# Patient Record
Sex: MALE | Race: OTHER | Employment: UNEMPLOYED | URBAN - METROPOLITAN AREA
[De-identification: names, ages, dates, MRNs, and addresses within clinical notes are randomized per-mention and may not be internally consistent; named-entity substitution may affect disease eponyms.]

---

## 2017-11-05 ENCOUNTER — APPOINTMENT (OUTPATIENT)
Dept: MRI IMAGING | Age: 42
End: 2017-11-05
Attending: HOSPITALIST
Payer: SUBSIDIZED

## 2017-11-05 ENCOUNTER — HOSPITAL ENCOUNTER (OUTPATIENT)
Age: 42
Setting detail: OBSERVATION
Discharge: HOME | End: 2017-11-06
Attending: EMERGENCY MEDICINE | Admitting: HOSPITALIST
Payer: SUBSIDIZED

## 2017-11-05 ENCOUNTER — APPOINTMENT (OUTPATIENT)
Dept: CT IMAGING | Age: 42
End: 2017-11-05
Attending: EMERGENCY MEDICINE
Payer: SUBSIDIZED

## 2017-11-05 DIAGNOSIS — H53.2 DIPLOPIA: Primary | ICD-10-CM

## 2017-11-05 DIAGNOSIS — H49.22 SIXTH NERVE PALSY OF LEFT EYE: ICD-10-CM

## 2017-11-05 LAB
ALBUMIN SERPL-MCNC: 4 G/DL (ref 3.5–5)
ALBUMIN/GLOB SERPL: 1.2 {RATIO} (ref 1.1–2.2)
ALP SERPL-CCNC: 99 U/L (ref 45–117)
ALT SERPL-CCNC: 27 U/L (ref 12–78)
ANION GAP SERPL CALC-SCNC: 6 MMOL/L (ref 5–15)
APPEARANCE UR: CLEAR
APTT PPP: 31.3 SEC (ref 22.1–32.5)
AST SERPL-CCNC: 19 U/L (ref 15–37)
BASOPHILS # BLD: 0 K/UL (ref 0–0.1)
BASOPHILS NFR BLD: 0 % (ref 0–1)
BILIRUB SERPL-MCNC: 0.5 MG/DL (ref 0.2–1)
BILIRUB UR QL: NEGATIVE
BUN SERPL-MCNC: 11 MG/DL (ref 6–20)
BUN/CREAT SERPL: 13 (ref 12–20)
CALCIUM SERPL-MCNC: 8.9 MG/DL (ref 8.5–10.1)
CHLORIDE SERPL-SCNC: 106 MMOL/L (ref 97–108)
CO2 SERPL-SCNC: 28 MMOL/L (ref 21–32)
COLOR UR: NORMAL
CREAT SERPL-MCNC: 0.84 MG/DL (ref 0.7–1.3)
EOSINOPHIL # BLD: 0.1 K/UL (ref 0–0.4)
EOSINOPHIL NFR BLD: 1 % (ref 0–7)
ERYTHROCYTE [DISTWIDTH] IN BLOOD BY AUTOMATED COUNT: 13 % (ref 11.5–14.5)
GLOBULIN SER CALC-MCNC: 3.4 G/DL (ref 2–4)
GLUCOSE SERPL-MCNC: 96 MG/DL (ref 65–100)
GLUCOSE UR STRIP.AUTO-MCNC: NEGATIVE MG/DL
HCT VFR BLD AUTO: 40.6 % (ref 36.6–50.3)
HGB BLD-MCNC: 13.7 G/DL (ref 12.1–17)
HGB UR QL STRIP: NEGATIVE
INR PPP: 1 (ref 0.9–1.1)
KETONES UR QL STRIP.AUTO: NEGATIVE MG/DL
LEUKOCYTE ESTERASE UR QL STRIP.AUTO: NEGATIVE
LYMPHOCYTES # BLD: 1.5 K/UL (ref 0.8–3.5)
LYMPHOCYTES NFR BLD: 18 % (ref 12–49)
MCH RBC QN AUTO: 30.5 PG (ref 26–34)
MCHC RBC AUTO-ENTMCNC: 33.7 G/DL (ref 30–36.5)
MCV RBC AUTO: 90.4 FL (ref 80–99)
MONOCYTES # BLD: 0.4 K/UL (ref 0–1)
MONOCYTES NFR BLD: 5 % (ref 5–13)
NEUTS SEG # BLD: 6 K/UL (ref 1.8–8)
NEUTS SEG NFR BLD: 76 % (ref 32–75)
NITRITE UR QL STRIP.AUTO: NEGATIVE
PH UR STRIP: 6.5 [PH] (ref 5–8)
PLATELET # BLD AUTO: 140 K/UL (ref 150–400)
POTASSIUM SERPL-SCNC: 3.8 MMOL/L (ref 3.5–5.1)
PROT SERPL-MCNC: 7.4 G/DL (ref 6.4–8.2)
PROT UR STRIP-MCNC: NEGATIVE MG/DL
PROTHROMBIN TIME: 10.7 SEC (ref 9–11.1)
RBC # BLD AUTO: 4.49 M/UL (ref 4.1–5.7)
SODIUM SERPL-SCNC: 140 MMOL/L (ref 136–145)
SP GR UR REFRACTOMETRY: <1.005 (ref 1–1.03)
THERAPEUTIC RANGE,PTTT: NORMAL SECS (ref 58–77)
UR CULT HOLD, URHOLD: NORMAL
UROBILINOGEN UR QL STRIP.AUTO: 0.2 EU/DL (ref 0.2–1)
WBC # BLD AUTO: 8 K/UL (ref 4.1–11.1)

## 2017-11-05 PROCEDURE — 74011250636 HC RX REV CODE- 250/636: Performed by: HOSPITALIST

## 2017-11-05 PROCEDURE — 99284 EMERGENCY DEPT VISIT MOD MDM: CPT

## 2017-11-05 PROCEDURE — 81003 URINALYSIS AUTO W/O SCOPE: CPT | Performed by: EMERGENCY MEDICINE

## 2017-11-05 PROCEDURE — 70553 MRI BRAIN STEM W/O & W/DYE: CPT

## 2017-11-05 PROCEDURE — 70450 CT HEAD/BRAIN W/O DYE: CPT

## 2017-11-05 PROCEDURE — A9576 INJ PROHANCE MULTIPACK: HCPCS | Performed by: HOSPITALIST

## 2017-11-05 PROCEDURE — 36415 COLL VENOUS BLD VENIPUNCTURE: CPT | Performed by: EMERGENCY MEDICINE

## 2017-11-05 PROCEDURE — 85730 THROMBOPLASTIN TIME PARTIAL: CPT | Performed by: EMERGENCY MEDICINE

## 2017-11-05 PROCEDURE — 85025 COMPLETE CBC W/AUTO DIFF WBC: CPT | Performed by: EMERGENCY MEDICINE

## 2017-11-05 PROCEDURE — 85610 PROTHROMBIN TIME: CPT | Performed by: EMERGENCY MEDICINE

## 2017-11-05 PROCEDURE — 99218 HC RM OBSERVATION: CPT

## 2017-11-05 PROCEDURE — 80053 COMPREHEN METABOLIC PANEL: CPT | Performed by: EMERGENCY MEDICINE

## 2017-11-05 PROCEDURE — 74011250637 HC RX REV CODE- 250/637: Performed by: HOSPITALIST

## 2017-11-05 RX ORDER — SODIUM CHLORIDE 0.9 % (FLUSH) 0.9 %
5-10 SYRINGE (ML) INJECTION EVERY 8 HOURS
Status: DISCONTINUED | OUTPATIENT
Start: 2017-11-05 | End: 2017-11-06 | Stop reason: HOSPADM

## 2017-11-05 RX ORDER — SODIUM CHLORIDE 0.9 % (FLUSH) 0.9 %
10 SYRINGE (ML) INJECTION
Status: COMPLETED | OUTPATIENT
Start: 2017-11-05 | End: 2017-11-05

## 2017-11-05 RX ORDER — SODIUM CHLORIDE 0.9 % (FLUSH) 0.9 %
5-10 SYRINGE (ML) INJECTION AS NEEDED
Status: DISCONTINUED | OUTPATIENT
Start: 2017-11-05 | End: 2017-11-06 | Stop reason: HOSPADM

## 2017-11-05 RX ORDER — ACETAMINOPHEN 325 MG/1
650 TABLET ORAL
Status: DISCONTINUED | OUTPATIENT
Start: 2017-11-05 | End: 2017-11-06 | Stop reason: HOSPADM

## 2017-11-05 RX ORDER — GUAIFENESIN 100 MG/5ML
81 LIQUID (ML) ORAL DAILY
Status: DISCONTINUED | OUTPATIENT
Start: 2017-11-06 | End: 2017-11-06 | Stop reason: HOSPADM

## 2017-11-05 RX ADMIN — ACETAMINOPHEN 650 MG: 325 TABLET ORAL at 19:18

## 2017-11-05 RX ADMIN — Medication 10 ML: at 19:18

## 2017-11-05 RX ADMIN — GADOTERIDOL 18 ML: 279.3 INJECTION, SOLUTION INTRAVENOUS at 16:06

## 2017-11-05 RX ADMIN — Medication 10 ML: at 15:58

## 2017-11-05 NOTE — ROUTINE PROCESS
TRANSFER - OUT REPORT:    Verbal report given to Susan(felipe) on Coleen Nathalie  being transferred to (unit) for routine progression of care       Report consisted of patients Situation, Background, Assessment and   Recommendations(SBAR). Information from the following report(s) SBAR, ED Summary, Intake/Output, MAR and Recent Results was reviewed with the receiving nurse. Lines:   Peripheral IV 11/05/17 Right Antecubital (Active)   Site Assessment Clean, dry, & intact 11/5/2017 11:54 AM   Phlebitis Assessment 0 11/5/2017 11:54 AM   Infiltration Assessment 0 11/5/2017 11:54 AM   Dressing Status Clean, dry, & intact 11/5/2017 11:54 AM   Dressing Type Tape;Transparent 11/5/2017 11:54 AM   Hub Color/Line Status Pink;Capped;Flushed;Patent 11/5/2017 11:54 AM   Action Taken Blood drawn 11/5/2017 11:54 AM   Alcohol Cap Used Yes 11/5/2017 11:54 AM        Opportunity for questions and clarification was provided.       Patient transported with:   Tech      To MRI and then 502

## 2017-11-05 NOTE — ED NOTES
Bedside and Verbal shift change report given to Yajaira Perea RN (oncoming nurse) by Carly Pringle RN (offgoing nurse). Report included the following information SBAR and ED Summary.

## 2017-11-05 NOTE — IP AVS SNAPSHOT
[address] [place] [address] 
[phone] Patient: Karely Nguyen MRN: [id number] RMQ:2/84/0215 My Medications TAKE these medications as instructed Instructions Each Dose to Equal  
 Morning Noon Evening Bedtime  
 aspirin 81 mg chewable tablet Start taking on:  11/7/2017 Your last dose was: Your next dose is: Take 1 Tab by mouth daily. 81 mg Where to Get Your Medications Information on where to get these meds will be given to you by the nurse or doctor. ! Ask your nurse or doctor about these medications  
  aspirin 81 mg chewable tablet

## 2017-11-05 NOTE — CONSULTS
Consult dictated. Sudden onset isolated sixth nerve palsy on the left-likely idiopathic. If MRI negative, may DC home. Will need therapy for vision.   Katey Sneed MD

## 2017-11-05 NOTE — ED PROVIDER NOTES
HPI Comments: 36 y.o. male with no significant past medical history who presents from Home with chief complaint of Visual Disturbance. Patient reports onset yesterday morning of a generalized frontal headache and dizziness. Patient states onset \"yesterday around 1600\" of double vision for a duration \"of about 5 minutes while he was driving described as \"seeing double lines and double cars\". Pt reports reoccurring symptoms last night which has been constant since onset, described as \"when looking straight on he sees double in his left eye\". Patient had a tooth pulled about \"15 days ago\" during which time he was taking Amoxicillin. Pt denies previous history of symptoms similar to those previous today. Pt denies previous history of Hypertension. Pt reports previous tobacco use, last used six months ago and alcohol use, last drank two months ago. Pt denies fever, chills, cough, congestion, SOB, chest pain, abdominal pain, nausea, vomiting, diarrhea, difficulty urinating, or dysuria. Pt denies any other acute medical complaints. There are no other acute medical concerns at this time. Social hx: Patient is Korean speaking    Note written by Vincent Last, as dictated by Onesimo Mabry MD 11:00 AM    The history is provided by the patient. The history is limited by a language barrier. History reviewed. No pertinent past medical history. History reviewed. No pertinent surgical history. History reviewed. No pertinent family history. Social History     Social History    Marital status:      Spouse name: N/A    Number of children: N/A    Years of education: N/A     Occupational History    Not on file.      Social History Main Topics    Smoking status: Not on file    Smokeless tobacco: Not on file    Alcohol use Not on file    Drug use: Not on file    Sexual activity: Not on file     Other Topics Concern    Not on file     Social History Narrative    No narrative on file ALLERGIES: Review of patient's allergies indicates no known allergies. Review of Systems   Constitutional: Negative for chills and fever. HENT: Negative for congestion. Eyes: Positive for visual disturbance. Respiratory: Negative for cough and shortness of breath. Cardiovascular: Negative for chest pain. Gastrointestinal: Negative for abdominal pain, diarrhea, nausea and vomiting. Genitourinary: Negative for difficulty urinating and dysuria. Neurological: Positive for dizziness and headaches. All other systems reviewed and are negative. Vitals:    11/05/17 1030   BP: 145/86   Pulse: 63   Resp: 18   Temp: 97.9 °F (36.6 °C)   SpO2: 98%   Weight: 86.6 kg (191 lb)            Physical Exam   Constitutional: He is oriented to person, place, and time. He appears well-developed and well-nourished. No distress. HENT:   Head: Normocephalic and atraumatic. Nose: Nose normal.   Mouth/Throat: Oropharynx is clear and moist.   Eyes: Conjunctivae and EOM are normal. Pupils are equal, round, and reactive to light. No scleral icterus. Neck: Normal range of motion. Neck supple. No JVD present. No tracheal deviation present. No thyromegaly present. No carotid bruits noted. Cardiovascular: Normal rate, regular rhythm, normal heart sounds and intact distal pulses. Exam reveals no gallop and no friction rub. No murmur heard. Pulmonary/Chest: Effort normal and breath sounds normal. No respiratory distress. He has no wheezes. He has no rales. He exhibits no tenderness. Abdominal: Soft. Bowel sounds are normal. He exhibits no distension and no mass. There is no tenderness. There is no rebound and no guarding. Musculoskeletal: Normal range of motion. He exhibits no edema or tenderness. Lymphadenopathy:     He has no cervical adenopathy. Neurological: He is alert and oriented to person, place, and time. He has normal reflexes. 5/5 strength with handgrips, biceps, and triceps.  5/5 strength with plantar dorsiflexion and extension. lateral movement of left eye is minimal, diplopia with lateral gaze only. Appears to have a possible 6th nerve palsy left     Skin: Skin is warm and dry. No rash noted. No erythema. Psychiatric: He has a normal mood and affect. His behavior is normal. Judgment and thought content normal.   Nursing note and vitals reviewed. Note written by Vincent Fernandez, as dictated by Adebayo Luke MD 11:02 AM      MDM  Number of Diagnoses or Management Options  Diplopia: new and requires workup     Amount and/or Complexity of Data Reviewed  Clinical lab tests: ordered and reviewed  Tests in the radiology section of CPT®: ordered and reviewed  Decide to obtain previous medical records or to obtain history from someone other than the patient: yes  Review and summarize past medical records: yes  Discuss the patient with other providers: yes  Independent visualization of images, tracings, or specimens: yes    Risk of Complications, Morbidity, and/or Mortality  Presenting problems: high  Diagnostic procedures: high  Management options: high    Patient Progress  Patient progress: stable    ED Course       Procedures  Patient's lab and CT appear normal. Consult is placed with neurology and with hospitalist for further evaluation of this acute onset of diplopia and paralysis of lateral gaze with the left eye. CONSULT NOTE:  1:02 PM Adebayo Luke MD spoke with Dr. Olamide Maria, Consult for Neurology. Discussed available thania gnostic tests and clinical findings. He is in agreement with care plans as outlined. CONSULT NOTE:  1:26 PM Adebayo Luke MD spoke with Dr. Cheyanne Bowen, Consult for Cardiology. Discussed available diagnostic tests and clinical findings. He is in agreement with care plans as outlined.

## 2017-11-05 NOTE — ED TRIAGE NOTES
Pt's daughter reports he began with double vision yesterday morning, along with mild discomfort to forehead. Pt denies dizziness, nausea or vomiting. Pt has had resent dental work to lower jaw.

## 2017-11-06 VITALS
WEIGHT: 191 LBS | SYSTOLIC BLOOD PRESSURE: 120 MMHG | HEART RATE: 58 BPM | DIASTOLIC BLOOD PRESSURE: 67 MMHG | TEMPERATURE: 98.9 F | RESPIRATION RATE: 16 BRPM | OXYGEN SATURATION: 97 %

## 2017-11-06 LAB
ATRIAL RATE: 59 BPM
CALCULATED P AXIS, ECG09: 51 DEGREES
CALCULATED R AXIS, ECG10: 14 DEGREES
CALCULATED T AXIS, ECG11: 7 DEGREES
CHOLEST SERPL-MCNC: 146 MG/DL
DIAGNOSIS, 93000: NORMAL
HDLC SERPL-MCNC: 55 MG/DL
HDLC SERPL: 2.7 {RATIO} (ref 0–5)
LDLC SERPL CALC-MCNC: 70 MG/DL (ref 0–100)
LIPID PROFILE,FLP: NORMAL
P-R INTERVAL, ECG05: 162 MS
Q-T INTERVAL, ECG07: 406 MS
QRS DURATION, ECG06: 96 MS
QTC CALCULATION (BEZET), ECG08: 401 MS
TRIGL SERPL-MCNC: 105 MG/DL (ref ?–150)
VENTRICULAR RATE, ECG03: 59 BPM
VLDLC SERPL CALC-MCNC: 21 MG/DL

## 2017-11-06 PROCEDURE — 93005 ELECTROCARDIOGRAM TRACING: CPT

## 2017-11-06 PROCEDURE — 74011250637 HC RX REV CODE- 250/637: Performed by: HOSPITALIST

## 2017-11-06 PROCEDURE — 36415 COLL VENOUS BLD VENIPUNCTURE: CPT | Performed by: HOSPITALIST

## 2017-11-06 PROCEDURE — 99218 HC RM OBSERVATION: CPT

## 2017-11-06 PROCEDURE — 80061 LIPID PANEL: CPT | Performed by: HOSPITALIST

## 2017-11-06 RX ORDER — GUAIFENESIN 100 MG/5ML
81 LIQUID (ML) ORAL DAILY
Qty: 30 TAB | Refills: 0 | Status: SHIPPED | OUTPATIENT
Start: 2017-11-07

## 2017-11-06 RX ADMIN — Medication 10 ML: at 05:26

## 2017-11-06 RX ADMIN — ASPIRIN 81 MG 81 MG: 81 TABLET ORAL at 08:55

## 2017-11-06 RX ADMIN — ACETAMINOPHEN 650 MG: 325 TABLET ORAL at 08:55

## 2017-11-06 NOTE — H&P
[address] [place] [address], [address] [place] [address]   HISTORY AND PHYSICAL       Name:  Herberth Edouard   MR#:  [id number]   :  1975   Account #:  [de-identified]        Date of Adm:  2017       PRIMARY CARE PHYSICIAN:  None. PRESENTING COMPLAINT:  Headache and double vision. HISTORY OF PRESENT ILLNESS:  This patient is a 44-year-old   German-speaking gentleman who presented due to a mild headache   which started yesterday morning. The patient had double vision which   started at 4:00 p.m. yesterday and lasted for 5 minutes. At that time,   he was driving. The patient's symptoms resolved. However, when he   woke up this morning, he still had double vision. The patient had some   teeth pulled out 15 days ago. At that time, he was given amoxicillin. He denies having any limb weakness, chest pain, shortness of breath,   nausea, vomiting, fever, or chills. PAST MEDICAL HISTORY:  Not significant. SOCIOECONOMIC HISTORY:  The patient does not smoke or drink. He is  and works [employer]. FAMILY HISTORY:  Unremarkable for stroke or coronary artery   disease. CURRENT MEDICATIONS:  None. ALLERGIES:  NO KNOWN DRUG ALLERGIES. PHYSICAL EXAMINATION   GENERAL:  On examination, the patient is a 44-year-old gentleman   who is not in any acute distress. VITAL SIGNS:  Temperature of 97.9. Blood pressure 145/86. Pulse   63. Respiratory rate is 18. Saturation 98%. HEENT:  Examination reveals pupils equally reacting to light and   accommodation. NECK:  Supple. There is no lymphadenopathy or JVD. CHEST:  Clear. No wheezing or crackles. CARDIOVASCULAR:  S1 and S2 regular. No murmur. No S3.   ABDOMEN:  Abdominal examination reveals no tenderness, no   guarding, and no rigidity. Bowel sounds are active. EXTREMITIES:  No pedal edema. CENTRAL NERVOUS SYSTEM:  Examination reveals the patient to   be alert and oriented.   He has normal strength and normal reflexes. Plantars are downgoing. His visual exam reveals left lateral rectus   palsy. There is no pronator drift. There is no tingling or numbness. are unremarkable. Cerebellar and gait examinations are   unremarkable. SKIN:  Examination is unremarkable. LABORATORY DATA:  Lab works reveals white count of 8,   hemoglobin of 13.7, hematocrit 40.6, MCV 90.4, and platelet count was   140,000. Chemistries revealed sodium 140, potassium 3.8, chloride   106, bicarbonate 28, gap of 6, glucose 96, BUN 11, creatinine 0.84,   bilirubin 0.5, protein 7.4, albumin was 4, and globulin was 3.4. AST,   ALT, and alkaline phosphatase were all within normal limits. Urinalysis   was unremarkable. INR was 1. A CT scan of the head showed no acute finding. An EKG has not   been done. ASSESSMENT AND PLAN   This patient is a 65-year-old gentleman with no past medical history. He is admitted due to:    1. Diplopia and headache due to isolated sixth cranial nerve palsy:    The case was discussed with Josh Arriaga M.D., who   recommended admission for further workup. 2. We will order and MRI, Carotid doppler and start on aspirin. 3. Further orders per Neurology. The patient is admitted for   observation.          Yesi Shah MD      [facility] / [facility]   D:  11/05/2017   13:45   T:  11/05/2017   21:35   Job #:  [id number]

## 2017-11-06 NOTE — PROGRESS NOTES
Reviewed medical chart; met with the patient at the bedside along with his grown daughter, Mally Jeremías DUFFY#[phone]. Patient speaks limited English, but opted not to use a blue phone; his daughter speaks [place] fluently. Patient is being seen for diplopia. Patient lives with his wife and two daughters. He is employed as a , but does not have insurance. Patient does not have a PCP; he gets his prescriptions at [facility] on [address]. Patient utilized the [facility] approximately 4 years ago, but is no longer connected. Provided patient with list of free clinics in the area, a copy of the Care Card application, contact information for Maria Isabel Dasilva, financial counselor for the hospital, and a list of medication assistance program/coupon websites. Current Discharge Plan:  Called and spoke with Ramana Nesbitt at the [address] [place] [address] to schedule a financial screening appointment for Monday, 11/13/17 at 9:00AM.  Patient may not qualify financially, as he works as a  and his wife is employed at [employer]. In the event that he is over assets financially, provided a list of [facility] physicians from which to choose. Discharge Caregiver: Patient's daughter, Mally Jeremías - Y#[phone]. Care Management Interventions  PCP Verified by CM:  Yes  Palliative Care Criteria Met (RRAT>21 & CHF Dx)?: No  Mode of Transport at Discharge:  (Patient will travel home via car.  )  MyChart Signup: No  Discharge Durable Medical Equipment: No  Physical Therapy Consult: No  Occupational Therapy Consult: No  Speech Therapy Consult: No  Current Support Network: Lives with Spouse  Confirm Follow Up Transport:  (Patient will travel home via car.  )  Plan discussed with Pt/Family/Caregiver: Yes  Freedom of Choice Offered: Yes  Discharge Location  Discharge Placement: Home

## 2017-11-06 NOTE — DISCHARGE SUMMARY
Discharge Summary       PATIENT ID: Tony Delarosa  MRN: [id number]   YOB: 1975    DATE OF ADMISSION: 11/5/2017 10:39 AM    DATE OF DISCHARGE: 11/6/2017    PRIMARY CARE PROVIDER: None     ATTENDING PHYSICIAN:  Getachew Zepeda MD   DISCHARGING PROVIDER: Getachew Zepeda MD    To contact this individual call [phone] and ask the  to page. If unavailable ask to be transferred the Adult Hospitalist Department. CONSULTATIONS: IP CONSULT TO NEUROLOGY  IP CONSULT TO NEUROLOGY  IP CONSULT TO HOSPITALIST    PROCEDURES/SURGERIES: * No surgery found *    ADMITTING [address] [place] [address] COURSE:       Per Neurology: Katey Sneed MD 11/5:         MRI scan of the brain is negative to my review.     ASSESSMENT AND PLAN: A 50-year-old male with no known past   medical history who is admitted with sudden onset of double vision. On   examination, he has isolated sixth nerve palsy on the left. This is most   likely idiopathic. If the MRI is negative, we may discharge him home   and treatment essentially is supportive, including outpatient therapy. If   his symptoms do not improve in the next 2 to 3 weeks, or he develops   any other symptoms, he should followup with neurology and may need   additional workup.       Please feel free to contact me with any further questions. Thank you   for this consultation. Per case:       JAMEE Hogue Care Management Addendum  Progress Notes Date of Service: 11/06/17 1128         []Hide copied text  Reviewed medical chart; met with the patient at the bedside along with his grown daughter, Yue BERMUDEZ#[phone]. Patient speaks limited English, but opted not to use a blue phone; his daughter speaks [place] fluently. Patient is being seen for diplopia. Patient lives with his wife and two daughters. He is employed as a , but does not have insurance. Patient does not have a PCP; he gets his prescriptions at [facility] on [address].    Patient utilized the [facility] approximately 4 years ago, but is no longer connected. Provided patient with list of free clinics in the area, a copy of the Care Card application, contact information for Corbin Moreno, financial counselor for the hospital, and a list of medication assistance program/coupon websites.       Current Discharge Plan:  Called and spoke with Ibis Arango at the [address] [place] [address] to schedule a financial screening appointment for Monday, 11/13/17 at 9:00AM.  Patient may not qualify financially, as he works as a  and his wife is employed at [employer]. In the event that he is over assets financially, provided a list of [facility] physicians from which to choose. Discharge Caregiver: Patient's daughter, Peter LOPEZ#[phone]. DISCHARGE DIAGNOSES / PLAN:      1.  as above        PENDING TEST RESULTS:   At the time of discharge the following test results are still pending: na    FOLLOW UP APPOINTMENTS:    Follow-up Information     Follow up With Details Comments Contact Info    [facility] On 11/13/2017 Financial Screening Appointment: Monday, 11/13/17 at 9:00AM.  (Medical Appointment will be scheduled when you complete the financial screening). Please call the [facility] at G#[phone] if you need to change this appointment. [facility]      Brittani Mcfadden MD In 3 weeks Call to get a neurology follow up appointment in 3-4 weeks following your appointment at the [address] [place] [address] Way  [phone]             ADDITIONAL CARE RECOMMENDATIONS: na    DIET: Cardiac Diet     ACTIVITY: Activity as tolerated    WOUND CARE: na    EQUIPMENT needed: na      DISCHARGE MEDICATIONS:  Current Discharge Medication List      START taking these medications    Details   aspirin 81 mg chewable tablet Take 1 Tab by mouth daily.   Qty: 30 Tab, Refills: 0               NOTIFY YOUR PHYSICIAN FOR ANY OF THE FOLLOWING:   Fever over 101 degrees for 24 hours. Chest pain, shortness of breath, fever, chills, nausea, vomiting, diarrhea, change in mentation, falling, weakness, bleeding. Severe pain or pain not relieved by medications. Or, any other signs or symptoms that you may have questions about.     DISPOSITION:    Home With:   OT  PT  HH  RN       Long term SNF/Inpatient Rehab   x Independent/assisted living    Hospice    Other:       PATIENT CONDITION AT DISCHARGE:     Functional status    Poor     Deconditioned    x Independent      Cognition   x  Lucid     Forgetful     Dementia      Catheters/lines (plus indication)    Sheets     PICC     PEG    x None      Code status   x  Full code     DNR      PHYSICAL EXAMINATION AT DISCHARGE:   Refer to Progress Note          CHRONIC MEDICAL DIAGNOSES:  Problem List as of 11/6/2017  Date Reviewed: 11/6/2017          Codes Class Noted - Resolved    * (Principal)Diplopia ICD-10-CM: H53.2  ICD-9-CM: 368.2  11/5/2017 - Present              Greater than  20  minutes were spent with the patient on counseling and coordination of care    Signed:   Lacey Goldman MD  11/6/2017  12:30 PM

## 2017-11-06 NOTE — DISCHARGE INSTRUCTIONS
DISCHARGE SUMMARY from Nurse    PATIENT INSTRUCTIONS:    After general anesthesia or intravenous sedation, for 24 hours or while taking prescription Narcotics:  · Limit your activities  · Do not drive and operate hazardous machinery  · Do not make important personal or business decisions  · Do  not drink alcoholic beverages  · If you have not urinated within 8 hours after discharge, please contact your surgeon on call. Report the following to your surgeon:  · Excessive pain, swelling, redness or odor of or around the surgical area  · Temperature over 100.5  · Nausea and vomiting lasting longer than 4 hours or if unable to take medications  · Any signs of decreased circulation or nerve impairment to extremity: change in color, persistent  numbness, tingling, coldness or increase pain  · Any questions    What to do at Home:  Recommended activity: per instructions  If you experience any of the following symptoms per instructtions, please follow up with per instructions. *  Please give a list of your current medications to your Primary Care Provider. *  Please update this list whenever your medications are discontinued, doses are      changed, or new medications (including over-the-counter products) are added. *  Please carry medication information at all times in case of emergency situations. These are general instructions for a healthy lifestyle:    No smoking/ No tobacco products/ Avoid exposure to second hand smoke  Surgeon General's Warning:  Quitting smoking now greatly reduces serious risk to your health.     Obesity, smoking, and sedentary lifestyle greatly increases your risk for illness    A healthy diet, regular physical exercise & weight monitoring are important for maintaining a healthy lifestyle    You may be retaining fluid if you have a history of heart failure or if you experience any of the following symptoms:  Weight gain of 3 pounds or more overnight or 5 pounds in a week, increased swelling in our hands or feet or shortness of breath while lying flat in bed. Please call your doctor as soon as you notice any of these symptoms; do not wait until your next office visit. Recognize signs and symptoms of STROKE:    F-face looks uneven    A-arms unable to move or move unevenly    S-speech slurred or non-existent    T-time-call 911 as soon as signs and symptoms begin-DO NOT go       Back to bed or wait to see if you get better-TIME IS BRAIN. Warning Signs of HEART ATTACK     Call 911 if you have these symptoms:   Chest discomfort. Most heart attacks involve discomfort in the center of the chest that lasts more than a few minutes, or that goes away and comes back. It can feel like uncomfortable pressure, squeezing, fullness, or pain.  Discomfort in other areas of the upper body. Symptoms can include pain or discomfort in one or both arms, the back, neck, jaw, or stomach.  Shortness of breath with or without chest discomfort.  Other signs may include breaking out in a cold sweat, nausea, or lightheadedness. Don't wait more than five minutes to call 911 - MINUTES MATTER! Fast action can save your life. Calling 911 is almost always the fastest way to get lifesaving treatment. Emergency Medical Services staff can begin treatment when they arrive -- up to an hour sooner than if someone gets to the hospital by car. The discharge information has been reviewed with the patient and daughter. The patient and daughter verbalized understanding. Discharge medications reviewed with the patient and daughter and appropriate educational materials and side effects teaching were provided.   ___________________________________________________________________________________________________________________________________   Discharge Instructions       PATIENT ID: Coleen Zelaya  MRN: [id number]   YOB: 1975    DATE OF ADMISSION: 11/5/2017 10:39 AM    DATE OF DISCHARGE: 11/6/2017    PRIMARY CARE PROVIDER: None     ATTENDING PHYSICIAN: Sonia Mendze MD  DISCHARGING PROVIDER: Sonia Mendez MD    To contact this individual call [phone] and ask the  to page. If unavailable ask to be transferred the Adult Hospitalist Department. DISCHARGE DIAGNOSES 6 nerve palsy     CONSULTATIONS: IP CONSULT TO NEUROLOGY  IP CONSULT TO NEUROLOGY  IP CONSULT TO HOSPITALIST    PROCEDURES/SURGERIES: * No surgery found *    PENDING TEST RESULTS:   At the time of discharge the following test results are still pending: na    FOLLOW UP APPOINTMENTS:   Follow-up Information     Follow up With Details Comments Contact Info    [facility] On 11/13/2017 Financial Screening Appointment: Monday, 11/13/17 at 9:00AM.  (Medical Appointment will be scheduled when you complete the financial screening). Please call the [facility] at K#[phone] if you need to change this appointment. [facility]      Errol Heller MD In 3 weeks Call to get a neurology follow up appointment in 3-4 weeks following your appointment at the [address] [place] [address] Way  [phone]             ADDITIONAL CARE RECOMMENDATIONS: na    DIET: Cardiac Diet     ACTIVITY: Activity as tolerated    WOUND CARE: na    EQUIPMENT needed: na      DISCHARGE MEDICATIONS:   See Medication Reconciliation Form    · It is important that you take the medication exactly as they are prescribed. · Keep your medication in the bottles provided by the pharmacist and keep a list of the medication names, dosages, and times to be taken in your wallet. · Do not take other medications without consulting your doctor. NOTIFY YOUR PHYSICIAN FOR ANY OF THE FOLLOWING:   Fever over 101 degrees for 24 hours. Chest pain, shortness of breath, fever, chills, nausea, vomiting, diarrhea, change in mentation, falling, weakness, bleeding.  Severe pain or pain not relieved by medications. Or, any other signs or symptoms that you may have questions about.       DISPOSITION:    Home With:   OT  PT  HH  RN       SNF/Inpatient Rehab/LTAC   x Independent/assisted living    Hospice    Other:          Signed:   Amina John MD  11/6/2017  12:29 PM

## 2017-11-06 NOTE — ROUTINE PROCESS
Bedside and Verbal shift change report given to Bradford Sampson (oncoming nurse) by Wojciech Moreno (offgoing nurse). Report included the following information SBAR, Kardex, Intake/Output, MAR, Accordion and Recent Results. All opportunity for clarification/questions given.

## 2017-11-06 NOTE — ROUTINE PROCESS
Bedside and Verbal shift change report given to [facility], RN (oncoming nurse) by Yuli Mackay RN (offgoing nurse). Report included the following information SBAR, Kardex, Intake/Output, MAR, Accordion and Recent Results.

## 2017-11-06 NOTE — CONSULTS
[address] [facility] [place] [address]   [address]       Name:  Carlyle Brice   MR#:  [id number]   :  1975   Account #:  [de-identified]    Date of Consultation:  2017   Date of Adm:  2017       REQUESTING PHYSICIAN:  Dr. Byron Cox. REASON FOR EVALUATION: Double vision. HISTORY OF PRESENT ILLNESS: The patient is a 13-year-old male   with no significant past medical history, who has had some tooth   issues over the past couple of weeks, which have finally cleared up   after a tooth extraction and antibiotics, and he was doing fine until   yesterday. At around 4 p.m., he developed double vision that lasted for   a brief period of time when he was driving. He was seeing double lines   and double call cars. He noticed the symptoms again last night, which   has been constant and he decided to come into the hospital today. He   states that he sees fine when he looks over to the right, but when he   looks to the left things appear double. He notices mild pain and   discomfort around his eye on the left side. No fever, no nausea,   vomiting, difficulty swallowing, speech disturbance, healing problems,   or altered sensation. No focal motor deficits in the extremities. Does   not take any medications at home. PAST MEDICAL HISTORY: As mentioned above. SOCIAL HISTORY: , lives with his family. No history of   smoking. He last smoked more than 6 months ago and does not drink   alcohol on a regular basis. ALLERGIES: NONE. REVIEW OF SYSTEMS: As mentioned above. PHYSICAL EXAMINATION   GENERAL: The patient is alert, fully oriented. VITAL SIGNS: Blood pressure 131/86, temperature 98, pulse is 61. NEUROLOGIC: Speech is clear, comprehension is normal. Pupils are   equal and reactive. Extraocular movement evaluation reveals absent   abduction of the left eye upon lateral gaze. Other extraocular   movements are intact.  Pupils are equal and reactive. Face is   symmetric. Facial sensation is intact. Tongue is midline. Hearing is   baseline. Muscle tone and bulk is normal. Strength normal in all   extremities. Deep tendon reflexes are 2/2 and symmetric. Toes are   downgoing. Sensation intact. Gait normal.   HEART: Regular rate and rhythm. CHEST: Clear. ABDOMEN: Soft, nontender, positive bowel sounds. EXTREMITIES: No edema. LABORATORY DATA: CBC and chemistries unremarkable. MRI scan of the brain is negative to my review. ASSESSMENT AND PLAN: A 50-year-old male with no known past   medical history who is admitted with sudden onset of double vision. On   examination, he has isolated sixth nerve palsy on the left. This is most   likely idiopathic. If the MRI is negative, we may discharge him home   and treatment essentially is supportive, including outpatient therapy. If   his symptoms do not improve in the next 2 to 3 weeks, or he develops   any other symptoms, he should followup with neurology and may need   additional workup. Please feel free to contact me with any further questions. Thank you   for this consultation.              MD MARIANA Medrano / SANJAY   D:  11/05/2017   16:54   T:  11/05/2017   22:58   Job #:  [id number]

## 2017-11-06 NOTE — PROGRESS NOTES
Discharge instructions reviewed with family member and patient. Family member interpreted. Patient seen by Dr. Morelia Hutchins prior to discharge. Yessenia Garcia Discharge instruction understanding good.

## 2017-11-06 NOTE — PROGRESS NOTES
1904 Pt complaining of a headache and dizziness. Pt states that when he has his L eye closed the dizziness improves and requesting an eye patch. Spoke with Dr. Denise Jaimes and received orders for Tylenol 650 mg PO Q6H PRN and that pt may wear an eye patch on L eye PRN for comfort.

## 2017-11-17 ENCOUNTER — HOSPITAL ENCOUNTER (OUTPATIENT)
Dept: LAB | Age: 42
Discharge: HOME | End: 2017-11-17

## 2017-11-17 PROCEDURE — 80061 LIPID PANEL: CPT | Performed by: NURSE PRACTITIONER

## 2017-11-17 PROCEDURE — 80053 COMPREHEN METABOLIC PANEL: CPT | Performed by: NURSE PRACTITIONER

## 2017-11-18 LAB
ALBUMIN SERPL-MCNC: 4.2 G/DL (ref 3.5–5)
ALBUMIN/GLOB SERPL: 1.3 {RATIO} (ref 1.1–2.2)
ALP SERPL-CCNC: 113 U/L (ref 45–117)
ALT SERPL-CCNC: 69 U/L (ref 12–78)
ANION GAP SERPL CALC-SCNC: 9 MMOL/L (ref 5–15)
AST SERPL-CCNC: 24 U/L (ref 15–37)
BILIRUB SERPL-MCNC: 0.4 MG/DL (ref 0.2–1)
BUN SERPL-MCNC: 13 MG/DL (ref 6–20)
BUN/CREAT SERPL: 17 (ref 12–20)
CALCIUM SERPL-MCNC: 8.9 MG/DL (ref 8.5–10.1)
CHLORIDE SERPL-SCNC: 103 MMOL/L (ref 97–108)
CHOLEST SERPL-MCNC: 212 MG/DL
CO2 SERPL-SCNC: 27 MMOL/L (ref 21–32)
CREAT SERPL-MCNC: 0.75 MG/DL (ref 0.7–1.3)
GLOBULIN SER CALC-MCNC: 3.2 G/DL (ref 2–4)
GLUCOSE SERPL-MCNC: 84 MG/DL (ref 65–100)
HDLC SERPL-MCNC: 58 MG/DL
HDLC SERPL: 3.7 {RATIO} (ref 0–5)
LDLC SERPL CALC-MCNC: 127.4 MG/DL (ref 0–100)
LIPID PROFILE,FLP: ABNORMAL
POTASSIUM SERPL-SCNC: 4.4 MMOL/L (ref 3.5–5.1)
PROT SERPL-MCNC: 7.4 G/DL (ref 6.4–8.2)
SODIUM SERPL-SCNC: 139 MMOL/L (ref 136–145)
TRIGL SERPL-MCNC: 133 MG/DL (ref ?–150)
VLDLC SERPL CALC-MCNC: 26.6 MG/DL

## 2018-01-17 ENCOUNTER — HOSPITAL ENCOUNTER (OUTPATIENT)
Dept: LAB | Age: 43
Discharge: HOME | End: 2018-01-17

## 2018-01-17 LAB
BASOPHILS # BLD: 0 K/UL (ref 0–0.1)
BASOPHILS NFR BLD: 1 % (ref 0–1)
CHOLEST SERPL-MCNC: 174 MG/DL
EOSINOPHIL # BLD: 0.1 K/UL (ref 0–0.4)
EOSINOPHIL NFR BLD: 2 % (ref 0–7)
ERYTHROCYTE [DISTWIDTH] IN BLOOD BY AUTOMATED COUNT: 13.6 % (ref 11.5–14.5)
ERYTHROCYTE [SEDIMENTATION RATE] IN BLOOD: 4 MM/HR (ref 0–15)
HCT VFR BLD AUTO: 42.8 % (ref 36.6–50.3)
HDLC SERPL-MCNC: 62 MG/DL
HDLC SERPL: 2.8 {RATIO} (ref 0–5)
HGB BLD-MCNC: 13.9 G/DL (ref 12.1–17)
LDLC SERPL CALC-MCNC: 101 MG/DL (ref 0–100)
LIPID PROFILE,FLP: ABNORMAL
LYMPHOCYTES # BLD: 1.4 K/UL (ref 0.8–3.5)
LYMPHOCYTES NFR BLD: 24 % (ref 12–49)
MCH RBC QN AUTO: 30.2 PG (ref 26–34)
MCHC RBC AUTO-ENTMCNC: 32.5 G/DL (ref 30–36.5)
MCV RBC AUTO: 92.8 FL (ref 80–99)
MONOCYTES # BLD: 0.3 K/UL (ref 0–1)
MONOCYTES NFR BLD: 5 % (ref 5–13)
NEUTS SEG # BLD: 4 K/UL (ref 1.8–8)
NEUTS SEG NFR BLD: 68 % (ref 32–75)
PLATELET # BLD AUTO: 142 K/UL (ref 150–400)
RBC # BLD AUTO: 4.61 M/UL (ref 4.1–5.7)
TRIGL SERPL-MCNC: 55 MG/DL (ref ?–150)
VLDLC SERPL CALC-MCNC: 11 MG/DL
WBC # BLD AUTO: 5.9 K/UL (ref 4.1–11.1)

## 2018-01-17 PROCEDURE — 86618 LYME DISEASE ANTIBODY: CPT | Performed by: NURSE PRACTITIONER

## 2018-01-17 PROCEDURE — 85025 COMPLETE CBC W/AUTO DIFF WBC: CPT | Performed by: NURSE PRACTITIONER

## 2018-01-17 PROCEDURE — 80061 LIPID PANEL: CPT | Performed by: NURSE PRACTITIONER

## 2018-01-17 PROCEDURE — 85652 RBC SED RATE AUTOMATED: CPT | Performed by: NURSE PRACTITIONER

## 2018-01-19 LAB — B BURGDOR IGG+IGM SER-ACNC: <0.91 ISR (ref 0–0.9)

## 2018-02-14 ENCOUNTER — HOSPITAL ENCOUNTER (OUTPATIENT)
Dept: MRI IMAGING | Age: 43
Discharge: HOME | End: 2018-02-14
Attending: OPHTHALMOLOGY
Payer: SUBSIDIZED

## 2018-02-14 VITALS — BODY MASS INDEX: 29.65 KG/M2 | WEIGHT: 195 LBS

## 2018-02-14 DIAGNOSIS — H57.12 OCULAR PAIN, LEFT: ICD-10-CM

## 2018-02-14 DIAGNOSIS — H49.22 CRANIAL NERVE VI PALSY, LEFT: ICD-10-CM

## 2018-02-14 DIAGNOSIS — H53.2 DIPLOPIA: ICD-10-CM

## 2018-02-14 DIAGNOSIS — H05.20 OCULAR PROPTOSIS: ICD-10-CM

## 2018-02-14 PROCEDURE — A9576 INJ PROHANCE MULTIPACK: HCPCS | Performed by: OPHTHALMOLOGY

## 2018-02-14 PROCEDURE — 74011250636 HC RX REV CODE- 250/636: Performed by: OPHTHALMOLOGY

## 2018-02-14 PROCEDURE — 70553 MRI BRAIN STEM W/O & W/DYE: CPT

## 2018-02-14 RX ADMIN — GADOTERIDOL 18 ML: 279.3 INJECTION, SOLUTION INTRAVENOUS at 20:18

## 2018-02-26 ENCOUNTER — OFFICE VISIT (OUTPATIENT)
Dept: NEUROLOGY | Age: 43
End: 2018-02-26

## 2018-02-26 VITALS
WEIGHT: 192 LBS | RESPIRATION RATE: 14 BRPM | DIASTOLIC BLOOD PRESSURE: 90 MMHG | OXYGEN SATURATION: 97 % | HEART RATE: 78 BPM | SYSTOLIC BLOOD PRESSURE: 140 MMHG | HEIGHT: 68 IN | BODY MASS INDEX: 29.1 KG/M2

## 2018-02-26 DIAGNOSIS — H49.22 LATERAL RECTUS PALSY, LEFT: Primary | ICD-10-CM

## 2018-02-26 RX ORDER — PREDNISONE 20 MG/1
80 TABLET ORAL
COMMUNITY

## 2018-02-26 RX ORDER — CALCIUM CARBONATE 600 MG
600 TABLET ORAL 2 TIMES DAILY
COMMUNITY

## 2018-02-26 RX ORDER — GLUCOSAMINE SULFATE 1500 MG
POWDER IN PACKET (EA) ORAL DAILY
COMMUNITY

## 2018-02-26 RX ORDER — OMEPRAZOLE 20 MG/1
20 CAPSULE, DELAYED RELEASE ORAL DAILY
COMMUNITY

## 2018-02-26 NOTE — PROGRESS NOTES
Patient is here for follow up from hospital for double vision  [facility]   Has been referred here to make sure its not neurological  Still having double vision

## 2018-02-26 NOTE — MR AVS SNAPSHOT
[facility] [address] [place] [address] 
[phone] Patient: Mari Black MRN: [id number] DK Visit Information Date & Time Provider Department Dept. Phone Encounter #  
 2018  9:15 AM Nathalie David MD [facility] at [address] [place] [address] Road [id number] Follow-up Instructions Return in about 2 months (around 2018). Upcoming Health Maintenance Date Due DTaP/Tdap/Td series (1 - Tdap) 1995 Influenza Age 5 to Adult 2017 Allergies as of 2018  Review Complete On: 2018 By: Nathalie David MD  
 No Known Allergies Current Immunizations  Never Reviewed No immunizations on file. Not reviewed this visit You Were Diagnosed With   
  
 Codes Comments Lateral rectus palsy, left    -  Primary ICD-10-CM: H49.22 
ICD-9-CM: 378.54 Vitals BP Pulse Resp Height(growth percentile) Weight(growth percentile) SpO2  
 140/90 78 14 5' 8\" (1.727 m) 192 lb (87.1 kg) 97% BMI Smoking Status 29.19 kg/m2 Never Smoker Vitals History BMI and BSA Data Body Mass Index Body Surface Area  
 29.19 kg/m 2 2.04 m 2 Your Updated Medication List  
  
   
This list is accurate as of 18  9:51 AM.  Always use your most recent med list.  
  
  
  
  
 aspirin 81 mg chewable tablet Take 1 Tab by mouth daily. calcium carbonate 600 mg calcium (1,500 mg) tablet Commonly known as:  Evonne Goodie Take 600 mg by mouth two (2) times a day. omeprazole 20 mg capsule Commonly known as:  PRILOSEC Take 20 mg by mouth daily. predniSONE 20 mg tablet Commonly known as:  Valma Belts Take 80 mg by mouth daily (with breakfast). VITAMIN D3 1,000 unit Cap Generic drug:  cholecalciferol Take  by mouth daily. We Performed the Following THOMAS, DIRECT, W/REFLEX [id number] CPT(R)] C REACTIVE PROTEIN, QT [41983 CPT(R)] TSH 3RD GENERATION [43293 CPT(R)] Follow-up Instructions Return in about 2 months (around 4/26/2018). Patient Instructions A Healthy Lifestyle: Care Instructions Your Care Instructions A healthy lifestyle can help you feel good, stay at a healthy weight, and have plenty of energy for both work and play. A healthy lifestyle is something you can share with your whole family. A healthy lifestyle also can lower your risk for serious health problems, such as high blood pressure, heart disease, and diabetes. You can follow a few steps listed below to improve your health and the health of your family. Follow-up care is a key part of your treatment and safety. Be sure to make and go to all appointments, and call your doctor if you are having problems. It's also a good idea to know your test results and keep a list of the medicines you take. How can you care for yourself at home? · Do not eat too much sugar, fat, or fast foods. You can still have dessert and treats now and then. The goal is moderation. · Start small to improve your eating habits. Pay attention to portion sizes, drink less juice and soda pop, and eat more fruits and vegetables. ¨ Eat a healthy amount of food. A 3-ounce serving of meat, for example, is about the size of a deck of cards. Fill the rest of your plate with vegetables and whole grains. ¨ Limit the amount of soda and sports drinks you have every day. Drink more water when you are thirsty. ¨ Eat at least 5 servings of fruits and vegetables every day. It may seem like a lot, but it is not hard to reach this goal. A serving or helping is 1 piece of fruit, 1 cup of vegetables, or 2 cups of leafy, raw vegetables. Have an apple or some carrot sticks as an afternoon snack instead of a candy bar. Try to have fruits and/or vegetables at every meal. 
· Make exercise part of your daily routine.  You may want to start with simple activities, such as walking, bicycling, or slow swimming. Try to be active 30 to 60 minutes every day. You do not need to do all 30 to 60 minutes all at once. For example, you can exercise 3 times a day for 10 or 20 minutes. Moderate exercise is safe for most people, but it is always a good idea to talk to your doctor before starting an exercise program. 
· Keep moving. [place] the lawn, work in the garden, or [employer] Take the stairs instead of the elevator at work. · If you smoke, quit. People who smoke have an increased risk for heart attack, stroke, cancer, and other lung illnesses. Quitting is hard, but there are ways to boost your chance of quitting tobacco for good. ¨ Use nicotine gum, patches, or lozenges. ¨ Ask your doctor about stop-smoking programs and medicines. ¨ Keep trying. In addition to reducing your risk of diseases in the future, you will notice some benefits soon after you stop using tobacco. If you have shortness of breath or asthma symptoms, they will likely get better within a few weeks after you quit. · Limit how much alcohol you drink. Moderate amounts of alcohol (up to 2 drinks a day for men, 1 drink a day for women) are okay. But drinking too much can lead to liver problems, high blood pressure, and other health problems. Family health If you have a family, there are many things you can do together to improve your health. · Eat meals together as a family as often as possible. · Eat healthy foods. This includes fruits, vegetables, lean meats and dairy, and whole grains. · Include your family in your fitness plan. Most people think of activities such as jogging or tennis as the way to fitness, but there are many ways you and your family can be more active. Anything that makes you breathe hard and gets your heart pumping is exercise. Here are some tips: 
¨ Walk to do errands or to take your child to school or the bus. ¨ Go for a family bike ride after dinner instead of watching TV. Where can you learn more? Go to http://caitie-negra.info/. Enter O429 in the search box to learn more about \"A Healthy Lifestyle: Care Instructions. \" Current as of: May 12, 2017 Content Version: 11.4 © 0339-8033 [employer]. Care instructions adapted under license by [employer] (which disclaims liability or warranty for this information). If you have questions about a medical condition or this instruction, always ask your healthcare professional. [address] any warranty or liability for your use of this information. Introducing [facility]! Chris Blair introduce portal paciente MyChart . Ahora se puede acceder a partes de lino expediente médico, enviar por correo electrónico la oficina de lino médico y solicitar renovaciones de medicamentos en línea. En lino navegador de Internet , Nakia Lorenzana a [email or web address] Luisito clic en el usuario por Carly Longoria? Kaylen Deacon clic aquí en la sesión Sunita Stanley. Verá la página de registro [place]ill. Ingrese lino código de [facility] Bela sarahy y javed aparece a continuación. Usted no tendrá que [employer] código después de compa completado el proceso de registro . Si usted no se inscribe antes de la fecha de caducidad , debe solicitar un nuevo código. · MyChart Código de acceso : [id number] Expires: 5/14/2018 11:21 AM 
 
Ingresa los últimos cuatro dígitos de lino Número de Seguro Social ( xxxx ) y fecha de nacimiento ( dd / mm / aaaa ) javed se indica y luisito clic en Enviar. Usted será llevado a la siguiente página de registro . Crear un ID MyChart . Esta será lino ID de inicio de sesión de MyChart y no puede ser [place] , por lo que pensar en adán que es Becka Willis y fácil de recordar . Crear adán contraseña MyChart . Usted puede cambiar lino contraseña en cualquier momento . Ingrese lino Password Reset de preguntas y [place] . Ackley se puede utilizar en un momento posterior si usted olvida lino contraseña. Introduzca lino dirección de correo electrónico . Darrelyn Dienes recibirá adán notificación por correo electrónico cuando la nueva información está disponible en MyChart . Kylie Gómezet clic en Registrarse. Kunal Bark lawrence y descargar porciones de lino expediente médico. 
Sivan clic en el enlace de descarga del menú Resumen para descargar adán copia portátil de lino información médica . Si tiene [employer] , por favor visite la sección de preguntas frecuentes del sitio web MyChart . Recuerde, [employer] NO es que se utilizará para las necesidades urgentes. Para emergencias médicas , llame al 911 . Ahora disponible en lino iPhone y Android ! Por favor proporcione zen resumen de la documentación de cuidado a lino próximo proveedor. Your primary care clinician is listed as NONE. If you have any questions after today's visit, please call [phone].

## 2018-02-26 NOTE — PATIENT INSTRUCTIONS

## 2018-02-26 NOTE — PROGRESS NOTES
ROCIO Jackson is a 36 y.o. male who was last evaluated by me in the hospital 2 months ago for double vision. He was suspected to have a left lateral rectus palsy. He says that things have improved overall and recently saw ophthalmology and was noted to have a slight left proptosis and apparently had complained about some pain in the eye. He had a repeat MRI scan of the brain which showed inflammatory changes in the left lateral rectus muscle. He was started on prednisone 80 mg daily and to slowly taper down. He has a follow-up appointment with them in the next 2 weeks. Denies any headache, changes in vision, speech problem, focal motor or sensory deficits,  joint pains, changes in bladder or bowel habits or hematuria. EXAM  Exam:  Visit Vitals    /90    Pulse 78    Resp 14    Ht 5' 8\" (1.727 m)    Wt 87.1 kg (192 lb)    SpO2 97%    BMI 29.19 kg/m2     Gen: Alert  CV: RRR  Lungs: non labored breathing  Abd: non distending  Neuro: A&O x 3, no dysarthria or aphasia  CN II-XII: PERRL, EOMI, face symmetric, tongue/palate midline. Slight left proptosis. Diplopia on looking to extreme left  Motor: strength 5/5 all four ext  Sensory: intact to LT  Gait: normal    LABS/ IMAGING  MRI Results (most recent):    Results from Hospital Encounter encounter on 02/14/18   MRI BRAIN W WO CONT   Narrative EXAMINATION: MR BRAIN    TECHNIQUE: Sagittal and axial T1-weighted images axial FLAIR, T2-weighted,  diffusion weighted, craniocaudal, brain images. Coronal STIR, axial and coronal  T1-weighted and axial and coronal postcontrast fat saturation T1 weighted thin  section images of the orbits.  Axial postcontrast T1-weighted images of the brain  , sagittal FLAIR images of the brain    IV CONTRAST:  18 cc ProHance    CLINICAL INFORMATION:  ATTn: ORBITS-- ,MASS OR VASCULAR ANOMALY, left 6th nerve  palsy, left proptosis and pain    COMPARISON:  MRI of 11/5/2017    FINDINGS:  EXTRA-AXIAL SPACES: Normal in size and morphology for the patient's age. INTRACRANIAL HEMORRHAGE: None. VENTRICULAR SYSTEM:  Normal in size and morphology for the patient's age. BASAL CISTERNS:  Normal.  CEREBRAL PARENCHYMA:  Normal.  MIDLINE SHIFT: None. CEREBELLUM:  Normal.  BRAINSTEM:  Normal.  CALVARIUM:  Normal.  VASCULAR SYSTEM:  Normal flow voids. PARANASAL SINUSES AND MASTOID AIR CELLS:  Clear. VISUALIZED ORBITS:  Isolated enlargement and enhancement of the left lateral  rectus muscle involving the muscle belly and musculotendinous junction, with  thickness up to 11 mm. New finding since 11/5/2017. 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 No other extraocular muscle  thickening. Left lacrimal gland does not appear involved. Other extraocular  muscles normal. No orbital masses. Normal appearance of the cavernous sinus. VISUALIZED UPPER CERVICAL SPINE:  Normal.  SELLA:  Normal.  SKULL BASE:  Normal.           Impression IMPRESSION:    1. 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 No other orbital abnormalities. 2. No intracranial abnormalities. 23X                ASSESSMENT    ICD-10-CM ICD-9-CM    1. Lateral rectus palsy, left H49.22 378.54 THOMAS, DIRECT, W/REFLEX      TSH 3RD GENERATION      C REACTIVE PROTEIN, QT       PLAN  Was found to have inflammatory changes in the left lateral rectus muscle. The cause is unclear.    We will check TSH, THOMAS screen for autoimmune conditions and C-reactive protein  Continue with tapering course of steroid and follow-up with ophthalmology  May need a rheumatology consultation depending upon his clinical course    Melina Adler MD

## 2018-02-26 NOTE — LETTER
2/26/2018 9:57 AM 
 
Patient:  Johan Friday YOB: 1975 Date of Visit: 2/26/2018 Dear Diamante Andrade [address] [address] 
 : 
 
Mr. Prado Friday came back for follow-up today. Below are the relevant portions of my assessment and plan of care. If you have questions, please do not hesitate to call me. I look forward to following Mr. Ratliffflor PalomoLena along with you. Sincerely, Polly Billy MD

## 2018-02-27 LAB
ANA SER QL: NEGATIVE
CRP SERPL-MCNC: 0.6 MG/L (ref 0–4.9)
TSH SERPL DL<=0.005 MIU/L-ACNC: 0.29 UIU/ML (ref 0.45–4.5)

## 2018-02-27 NOTE — PROGRESS NOTES
Lab work okay except for low thyroid-stimulating hormone which could mean hypothyroidism. Patient will need to discuss this with his PCP.

## 2018-03-01 ENCOUNTER — TELEPHONE (OUTPATIENT)
Dept: NEUROLOGY | Age: 43
End: 2018-03-01

## 2018-03-01 NOTE — TELEPHONE ENCOUNTER
----- Message from Yony Greco MD sent at 2/27/2018  4:21 PM EST -----  Lab work okay except for low thyroid-stimulating hormone which could mean hypothyroidism. Patient will need to discuss this with his PCP.

## 2018-03-07 ENCOUNTER — HOSPITAL ENCOUNTER (OUTPATIENT)
Dept: LAB | Age: 43
Discharge: HOME | End: 2018-03-07

## 2018-03-07 LAB
T4 FREE SERPL-MCNC: 0.9 NG/DL (ref 0.8–1.5)
TSH SERPL DL<=0.05 MIU/L-ACNC: 0.15 UIU/ML (ref 0.36–3.74)

## 2018-03-07 PROCEDURE — 84480 ASSAY TRIIODOTHYRONINE (T3): CPT | Performed by: NURSE PRACTITIONER

## 2018-03-07 PROCEDURE — 84443 ASSAY THYROID STIM HORMONE: CPT | Performed by: NURSE PRACTITIONER

## 2018-03-07 PROCEDURE — 84439 ASSAY OF FREE THYROXINE: CPT | Performed by: NURSE PRACTITIONER

## 2018-03-09 LAB — T3 SERPL-MCNC: 100 NG/DL (ref 71–180)

## 2018-04-27 ENCOUNTER — OFFICE VISIT (OUTPATIENT)
Dept: NEUROLOGY | Age: 43
End: 2018-04-27

## 2018-04-27 VITALS
HEIGHT: 68 IN | BODY MASS INDEX: 30.77 KG/M2 | RESPIRATION RATE: 14 BRPM | WEIGHT: 203 LBS | DIASTOLIC BLOOD PRESSURE: 84 MMHG | SYSTOLIC BLOOD PRESSURE: 120 MMHG | HEART RATE: 72 BPM | OXYGEN SATURATION: 95 %

## 2018-04-27 DIAGNOSIS — H53.2 DIPLOPIA: Primary | ICD-10-CM

## 2018-04-27 DIAGNOSIS — H05.10 IDIOPATHIC LEFT ORBITAL INFLAMMATORY SYNDROME: ICD-10-CM

## 2018-04-27 DIAGNOSIS — H49.22 LATERAL RECTUS PALSY, LEFT: ICD-10-CM

## 2018-04-27 NOTE — PROGRESS NOTES
ROCIO Cantrell came back for follow-up. Overall, he is doing much better. He still has double vision when he looks to the extreme left side. He developed double vision about 4-5 months ago and was found to have left lateral rectus palsy. Brain MRI showed thickening and enhancement of the left lateral rectus muscle. This is suspected to be related to idiopathic orbital inflammation. He has been following up with [facility] and is on prednisone which has helped. This was tapered down but his symptoms got worse and he is now back up on 40 mg daily. It was initially started at 80 mg daily. Denies any muscle aches, joint pains, headache, changes in vision, speech problem, focal motor or sensory deficits,  changes in bladder or bowel habits or hematuria. EXAM  Exam:  Visit Vitals    /84    Pulse 72    Resp 14    Ht 5' 8\" (1.727 m)    Wt 92.1 kg (203 lb)    SpO2 95%    BMI 30.87 kg/m2     Gen: Alert  CV: RRR  Lungs: non labored breathing  Abd: non distending  Neuro: A&O x 3, no dysarthria or aphasia  CN II-XII: PERRL, EOMI, face symmetric, tongue/palate midline. Slight left proptosis. Diplopia on looking to extreme left  Motor: strength 5/5 all four ext  Sensory: intact to LT  Gait: normal    LABS/ IMAGING  MRI Results (most recent):    Results from Hospital Encounter encounter on 02/14/18   MRI BRAIN W WO CONT   Narrative EXAMINATION: MR BRAIN    TECHNIQUE: Sagittal and axial T1-weighted images axial FLAIR, T2-weighted,  diffusion weighted, craniocaudal, brain images. Coronal STIR, axial and coronal  T1-weighted and axial and coronal postcontrast fat saturation T1 weighted thin  section images of the orbits.  Axial postcontrast T1-weighted images of the brain  , sagittal FLAIR images of the brain    IV CONTRAST:  18 cc ProHance    CLINICAL INFORMATION:  ATTn: ORBITS-- ,MASS OR VASCULAR ANOMALY, left 6th nerve  palsy, left proptosis and pain    COMPARISON: MRI of 11/5/2017    FINDINGS:  EXTRA-AXIAL SPACES: Normal in size and morphology for the patient's age. INTRACRANIAL HEMORRHAGE: None. VENTRICULAR SYSTEM:  Normal in size and morphology for the patient's age. BASAL CISTERNS:  Normal.  CEREBRAL PARENCHYMA:  Normal.  MIDLINE SHIFT: None. CEREBELLUM:  Normal.  BRAINSTEM:  Normal.  CALVARIUM:  Normal.  VASCULAR SYSTEM:  Normal flow voids. PARANASAL SINUSES AND MASTOID AIR CELLS:  Clear. VISUALIZED ORBITS:  Isolated enlargement and enhancement of the left lateral  rectus muscle involving the muscle belly and musculotendinous junction, with  thickness up to 11 mm. New finding since 11/5/2017. 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 No other extraocular muscle  thickening. Left lacrimal gland does not appear involved. Other extraocular  muscles normal. No orbital masses. Normal appearance of the cavernous sinus. VISUALIZED UPPER CERVICAL SPINE:  Normal.  SELLA:  Normal.  SKULL BASE:  Normal.           Impression IMPRESSION:    1. 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 No other orbital abnormalities. 2. No intracranial abnormalities. 23X            TSH was low but T3 and T4 was normal  THOMAS was negative  ESR was normal  C-reactive protein was negative  Lyme serology was negative    ASSESSMENT    ICD-10-CM ICD-9-CM    1. Diplopia H53.2 368.2    2. Lateral rectus palsy, left H49.22 378.54    3. 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 The cause is unclear and likely related to idiopathic inflammation in the orbit .    Continue with tapering course of steroid and follow-up with ophthalmology  No additional workup planned from a neurological standpoint at this point  Follow-up as needed      Josh BERMUDEZ Juwan Pitts MD

## 2018-04-27 NOTE — MR AVS SNAPSHOT
[place] [address] [address] [place] [address] 
[phone] Patient: Gerber Lira MRN: [id number] SK Visit Information Date & Time Provider Department Dept. Phone Encounter #  
 2018  8:40 AM Chandra Argueta MD [facility] at [address] [id number] Follow-up Instructions Return if symptoms worsen or fail to improve. Upcoming Health Maintenance Date Due DTaP/Tdap/Td series (1 - Tdap) 1995 Influenza Age 5 to Adult 2018 Allergies as of 2018  Review Complete On: 2018 By: Chandra Argueta MD  
 No Known Allergies Current Immunizations  Never Reviewed No immunizations on file. Not reviewed this visit You Were Diagnosed With   
  
 Codes Comments Diplopia    -  Primary ICD-10-CM: H53.2 ICD-9-CM: 368.2 Lateral rectus palsy, left     ICD-10-CM: H49.22 
ICD-9-CM: 378.54 Vitals BP Pulse Resp Height(growth percentile) Weight(growth percentile) SpO2  
 120/84 72 14 5' 8\" (1.727 m) 203 lb (92.1 kg) 95% BMI Smoking Status 30.87 kg/m2 Never Smoker Vitals History BMI and BSA Data Body Mass Index Body Surface Area  
 30.87 kg/m 2 2.1 m 2 Your Updated Medication List  
  
   
This list is accurate as of 18  8:57 AM.  Always use your most recent med list.  
  
  
  
  
 aspirin 81 mg chewable tablet Take 1 Tab by mouth daily. calcium carbonate 600 mg calcium (1,500 mg) tablet Commonly known as:  Dulcy Roshan Take 600 mg by mouth two (2) times a day. omeprazole 20 mg capsule Commonly known as:  PRILOSEC Take 20 mg by mouth daily. predniSONE 20 mg tablet Commonly known as:  Royer Kake Take 80 mg by mouth daily (with breakfast). VITAMIN D3 1,000 unit Cap Generic drug:  cholecalciferol Take  by mouth daily. Follow-up Instructions Return if symptoms worsen or fail to improve. Introducing \[facility]\"" HEALTH SERVICES! [facility] introduce portal paciente MyChart . Ahora se puede acceder a partes de lino expediente médico, enviar por correo electrónico la oficina de lino médico y solicitar renovaciones de medicamentos en línea. En lino navegador de Internet , Dominique Buck a [email or web address] Luisito clic en el usuario por Carlos Benton? Aden Pepper clic aquí en la sesión [employer]. Verá la página de registro [place]. Ingrese lino código de [employer] sarahy y javed aparece a continuación. Usted no tendrá que [employer] código después de compa completado el proceso de registro . Si usted no se inscribe antes de la fecha de caducidad , debe solicitar un nuevo código. · MyChart Código de acceso : [id number] Expires: 5/14/2018 12:21 PM 
 
Ingresa los últimos cuatro dígitos de lino Número de Seguro Social ( xxxx ) y fecha de nacimiento ( dd / mm / aaaa ) javed se indica y luisito clic en Enviar. Usted será llevado a la siguiente página de registro . Crear un ID MyChart . Esta será lino ID de inicio de sesión de MyChart y no puede ser [place] , por lo que pensar en adán que es Juliet Hamming y fácil de recordar . Crear adán contraseña MyChart . Usted puede cambiar lino contraseña en cualquier momento . Ingrese lino Password Reset de preguntas y [place] . [place] se puede utilizar en un momento posterior si usted olvida lino contraseña. Introduzca lino dirección de correo electrónico . Joselyn Javier recibirá adán notificación por correo electrónico cuando la nueva información está disponible en MyChart . Jessica bone en Registrarse. [employer] lawrence y descargar porciones de lino expediente médico. 
Luisito smitha en el enlace de descarga del menú Resumen para descargar adán copia portátil de lino información médica . Si tiene [employer] , por favor visite la sección de preguntas frecuentes del sitio web MyChart .  Recuerde, MyChart NO es que se utilizará para las [employer]. Para emergencias médicas , llame al 911 . Ahora disponible en lino iPhone y Android ! Por favor proporcione zen resumen de la documentación de cuidado a lino próximo proveedor. Your primary care clinician is listed as NONE. If you have any questions after today's visit, please call [phone].

## 2018-06-06 ENCOUNTER — HOSPITAL ENCOUNTER (OUTPATIENT)
Dept: MRI IMAGING | Age: 43
Discharge: HOME | End: 2018-06-06
Attending: OPHTHALMOLOGY
Payer: SUBSIDIZED

## 2018-06-06 VITALS — WEIGHT: 208 LBS | BODY MASS INDEX: 31.63 KG/M2

## 2018-06-06 DIAGNOSIS — H05.89 ORBITAL FLOOR SYNDROME: ICD-10-CM

## 2018-06-06 DIAGNOSIS — H05.822: ICD-10-CM

## 2018-06-06 PROCEDURE — A9575 INJ GADOTERATE MEGLUMI 0.1ML: HCPCS | Performed by: OPHTHALMOLOGY

## 2018-06-06 PROCEDURE — 70553 MRI BRAIN STEM W/O & W/DYE: CPT

## 2018-06-06 PROCEDURE — 74011250636 HC RX REV CODE- 250/636: Performed by: OPHTHALMOLOGY

## 2018-06-06 RX ORDER — GADOTERATE MEGLUMINE 376.9 MG/ML
18 INJECTION INTRAVENOUS
Status: COMPLETED | OUTPATIENT
Start: 2018-06-06 | End: 2018-06-06

## 2018-06-06 RX ADMIN — GADOTERATE MEGLUMINE 18 ML: 376.9 INJECTION INTRAVENOUS at 17:00

## 2018-06-27 ENCOUNTER — HOSPITAL ENCOUNTER (OUTPATIENT)
Dept: LAB | Age: 43
Discharge: HOME | End: 2018-06-27

## 2018-06-27 LAB
CK SERPL-CCNC: 115 U/L (ref 39–308)
ERYTHROCYTE [SEDIMENTATION RATE] IN BLOOD: 1 MM/HR (ref 0–15)
T3FREE SERPL-MCNC: 3.2 PG/ML (ref 2.2–4)
T4 FREE SERPL-MCNC: 0.8 NG/DL (ref 0.8–1.5)
TSH SERPL DL<=0.05 MIU/L-ACNC: 0.15 UIU/ML (ref 0.36–3.74)

## 2018-06-27 PROCEDURE — 84481 FREE ASSAY (FT-3): CPT | Performed by: NURSE PRACTITIONER

## 2018-06-27 PROCEDURE — 84439 ASSAY OF FREE THYROXINE: CPT | Performed by: NURSE PRACTITIONER

## 2018-06-27 PROCEDURE — 83520 IMMUNOASSAY QUANT NOS NONAB: CPT | Performed by: NURSE PRACTITIONER

## 2018-06-27 PROCEDURE — 85652 RBC SED RATE AUTOMATED: CPT | Performed by: NURSE PRACTITIONER

## 2018-06-27 PROCEDURE — 84443 ASSAY THYROID STIM HORMONE: CPT | Performed by: NURSE PRACTITIONER

## 2018-06-27 PROCEDURE — 86682 HELMINTH ANTIBODY: CPT | Performed by: NURSE PRACTITIONER

## 2018-06-27 PROCEDURE — 86038 ANTINUCLEAR ANTIBODIES: CPT | Performed by: NURSE PRACTITIONER

## 2018-06-27 PROCEDURE — 82164 ANGIOTENSIN I ENZYME TEST: CPT | Performed by: NURSE PRACTITIONER

## 2018-06-27 PROCEDURE — 82550 ASSAY OF CK (CPK): CPT | Performed by: NURSE PRACTITIONER

## 2018-06-27 PROCEDURE — 84445 ASSAY OF TSI GLOBULIN: CPT | Performed by: NURSE PRACTITIONER

## 2018-06-29 LAB
ACE SERPL-CCNC: 32 U/L (ref 14–82)
ANA SER QL: NEGATIVE
C-ANCA TITR SER IF: NORMAL TITER
MYELOPEROXIDASE AB SER IA-ACNC: <9 U/ML (ref 0–9)
P-ANCA ATYPICAL TITR SER IF: NORMAL TITER
P-ANCA TITR SER IF: NORMAL TITER
PROTEINASE3 AB SER IA-ACNC: <3.5 U/ML (ref 0–3.5)
SEE BELOW:, 164879: NORMAL
TSI ACT/NOR SER: <0.1 IU/L (ref 0–0.55)

## 2018-07-05 LAB — FILARIA IGG4 SER IA-ACNC: 0.32

## 2018-09-11 ENCOUNTER — HOSPITAL ENCOUNTER (EMERGENCY)
Age: 43
Discharge: HOME | End: 2018-09-11
Attending: STUDENT IN AN ORGANIZED HEALTH CARE EDUCATION/TRAINING PROGRAM
Payer: SUBSIDIZED

## 2018-09-11 ENCOUNTER — APPOINTMENT (OUTPATIENT)
Dept: CT IMAGING | Age: 43
End: 2018-09-11
Attending: PHYSICIAN ASSISTANT
Payer: SUBSIDIZED

## 2018-09-11 VITALS
WEIGHT: 215.2 LBS | BODY MASS INDEX: 27.62 KG/M2 | HEIGHT: 74 IN | HEART RATE: 66 BPM | DIASTOLIC BLOOD PRESSURE: 79 MMHG | RESPIRATION RATE: 16 BRPM | SYSTOLIC BLOOD PRESSURE: 130 MMHG | TEMPERATURE: 98 F | OXYGEN SATURATION: 99 %

## 2018-09-11 DIAGNOSIS — N20.0 RENAL CALCULI: Primary | ICD-10-CM

## 2018-09-11 LAB
ALBUMIN SERPL-MCNC: 3.6 G/DL (ref 3.5–5)
ALBUMIN/GLOB SERPL: 1.1 {RATIO} (ref 1.1–2.2)
ALP SERPL-CCNC: 77 U/L (ref 45–117)
ALT SERPL-CCNC: 48 U/L (ref 12–78)
ANION GAP SERPL CALC-SCNC: 10 MMOL/L (ref 5–15)
APPEARANCE UR: ABNORMAL
AST SERPL-CCNC: 17 U/L (ref 15–37)
BACTERIA URNS QL MICRO: ABNORMAL /HPF
BASOPHILS # BLD: 0 K/UL (ref 0–0.1)
BASOPHILS NFR BLD: 0 % (ref 0–1)
BILIRUB SERPL-MCNC: 0.4 MG/DL (ref 0.2–1)
BILIRUB UR QL CFM: NEGATIVE
BUN SERPL-MCNC: 10 MG/DL (ref 6–20)
BUN/CREAT SERPL: 11 (ref 12–20)
CALCIUM SERPL-MCNC: 8.8 MG/DL (ref 8.5–10.1)
CHLORIDE SERPL-SCNC: 103 MMOL/L (ref 97–108)
CO2 SERPL-SCNC: 27 MMOL/L (ref 21–32)
COLOR UR: ABNORMAL
COMMENT, HOLDF: NORMAL
CREAT SERPL-MCNC: 0.93 MG/DL (ref 0.7–1.3)
DIFFERENTIAL METHOD BLD: ABNORMAL
EOSINOPHIL # BLD: 0.1 K/UL (ref 0–0.4)
EOSINOPHIL NFR BLD: 1 % (ref 0–7)
EPITH CASTS URNS QL MICRO: ABNORMAL /LPF
ERYTHROCYTE [DISTWIDTH] IN BLOOD BY AUTOMATED COUNT: 14.1 % (ref 11.5–14.5)
GLOBULIN SER CALC-MCNC: 3.3 G/DL (ref 2–4)
GLUCOSE SERPL-MCNC: 104 MG/DL (ref 65–100)
GLUCOSE UR STRIP.AUTO-MCNC: NEGATIVE MG/DL
HCT VFR BLD AUTO: 41.4 % (ref 36.6–50.3)
HGB BLD-MCNC: 13.8 G/DL (ref 12.1–17)
HGB UR QL STRIP: ABNORMAL
IMM GRANULOCYTES # BLD: 0.1 K/UL (ref 0–0.04)
IMM GRANULOCYTES NFR BLD AUTO: 1 % (ref 0–0.5)
KETONES UR QL STRIP.AUTO: ABNORMAL MG/DL
LEUKOCYTE ESTERASE UR QL STRIP.AUTO: ABNORMAL
LIPASE SERPL-CCNC: 138 U/L (ref 73–393)
LYMPHOCYTES # BLD: 2.2 K/UL (ref 0.8–3.5)
LYMPHOCYTES NFR BLD: 20 % (ref 12–49)
MCH RBC QN AUTO: 31.5 PG (ref 26–34)
MCHC RBC AUTO-ENTMCNC: 33.3 G/DL (ref 30–36.5)
MCV RBC AUTO: 94.5 FL (ref 80–99)
MONOCYTES # BLD: 0.6 K/UL (ref 0–1)
MONOCYTES NFR BLD: 5 % (ref 5–13)
NEUTS SEG # BLD: 8.1 K/UL (ref 1.8–8)
NEUTS SEG NFR BLD: 73 % (ref 32–75)
NITRITE UR QL STRIP.AUTO: POSITIVE
NRBC # BLD: 0 K/UL (ref 0–0.01)
NRBC BLD-RTO: 0 PER 100 WBC
PH UR STRIP: 6.5 [PH] (ref 5–8)
PLATELET # BLD AUTO: 168 K/UL (ref 150–400)
PMV BLD AUTO: 11.9 FL (ref 8.9–12.9)
POTASSIUM SERPL-SCNC: 3.3 MMOL/L (ref 3.5–5.1)
PROT SERPL-MCNC: 6.9 G/DL (ref 6.4–8.2)
PROT UR STRIP-MCNC: 100 MG/DL
RBC # BLD AUTO: 4.38 M/UL (ref 4.1–5.7)
RBC #/AREA URNS HPF: >100 /HPF (ref 0–5)
SAMPLES BEING HELD,HOLD: NORMAL
SODIUM SERPL-SCNC: 140 MMOL/L (ref 136–145)
SP GR UR REFRACTOMETRY: >1.03 (ref 1–1.03)
UR CULT HOLD, URHOLD: NORMAL
UROBILINOGEN UR QL STRIP.AUTO: 1 EU/DL (ref 0.2–1)
WBC # BLD AUTO: 11 K/UL (ref 4.1–11.1)
WBC URNS QL MICRO: ABNORMAL /HPF (ref 0–4)
YEAST URNS QL MICRO: PRESENT

## 2018-09-11 PROCEDURE — 85025 COMPLETE CBC W/AUTO DIFF WBC: CPT | Performed by: PHYSICIAN ASSISTANT

## 2018-09-11 PROCEDURE — 81001 URINALYSIS AUTO W/SCOPE: CPT | Performed by: PHYSICIAN ASSISTANT

## 2018-09-11 PROCEDURE — 80053 COMPREHEN METABOLIC PANEL: CPT | Performed by: PHYSICIAN ASSISTANT

## 2018-09-11 PROCEDURE — 96361 HYDRATE IV INFUSION ADD-ON: CPT

## 2018-09-11 PROCEDURE — 74011250636 HC RX REV CODE- 250/636: Performed by: STUDENT IN AN ORGANIZED HEALTH CARE EDUCATION/TRAINING PROGRAM

## 2018-09-11 PROCEDURE — 96374 THER/PROPH/DIAG INJ IV PUSH: CPT

## 2018-09-11 PROCEDURE — 83690 ASSAY OF LIPASE: CPT | Performed by: PHYSICIAN ASSISTANT

## 2018-09-11 PROCEDURE — 74176 CT ABD & PELVIS W/O CONTRAST: CPT

## 2018-09-11 PROCEDURE — 99284 EMERGENCY DEPT VISIT MOD MDM: CPT

## 2018-09-11 PROCEDURE — 36415 COLL VENOUS BLD VENIPUNCTURE: CPT | Performed by: PHYSICIAN ASSISTANT

## 2018-09-11 RX ORDER — KETOROLAC TROMETHAMINE 30 MG/ML
15 INJECTION, SOLUTION INTRAMUSCULAR; INTRAVENOUS
Status: COMPLETED | OUTPATIENT
Start: 2018-09-11 | End: 2018-09-11

## 2018-09-11 RX ORDER — KETOROLAC TROMETHAMINE 10 MG/1
10 TABLET, FILM COATED ORAL
Qty: 12 TAB | Refills: 0 | Status: SHIPPED | OUTPATIENT
Start: 2018-09-11 | End: 2018-09-14

## 2018-09-11 RX ADMIN — SODIUM CHLORIDE 1000 ML: 900 INJECTION, SOLUTION INTRAVENOUS at 19:40

## 2018-09-11 RX ADMIN — KETOROLAC TROMETHAMINE 15 MG: 30 INJECTION, SOLUTION INTRAMUSCULAR at 19:59

## 2018-09-11 NOTE — ED PROVIDER NOTES
HPI Comments: 39 y.o. male with past medical history significant for diplopia who presents from home via private vehicle, accompanied by entire family, with chief complaint of abdominal pain. Patient c/o left sided abdominal pain onset 2 hours ago, which was described as a 6/10 severity. An hour later, patient reports he saw blood in his urine. Patient took a pain killer, given to him by his wife, and pain subsided 1 hour later. He reports current pain is 1/10. Patient reports history of same symptoms 30 years ago on his R side, and had a subsequent appendectomy. Patient currently takes medication for diplopia regularly, but notes it has been \"under control\" for 1 year. Patient specifiaclly denies fever and chills. Pt denies any history of kidney stones. There are no other acute medical concerns at this time. Social hx: Former tobaccos smoker (Quit 2017); Denies EtOH use (Quit 2017); Denies illicit drug use; Works in [employer]    Note written by Vincent Tom, as dictated by Bryan Santos MD 7:11 PM    The history is provided by the patient, the spouse and a relative. A  was used. Past Medical History:   Diagnosis Date    Diplopia        Past Surgical History:   Procedure Laterality Date    HX APPENDECTOMY           History reviewed. No pertinent family history. Social History     Social History    Marital status:      Spouse name: N/A    Number of children: N/A    Years of education: N/A     Occupational History    Not on file. Social History Main Topics    Smoking status: Never Smoker    Smokeless tobacco: Never Used    Alcohol use No    Drug use: Not on file    Sexual activity: Not on file     Other Topics Concern    Not on file     Social History Narrative    ** Merged History Encounter **              ALLERGIES: Review of patient's allergies indicates no known allergies.     Review of Systems   Constitutional: Negative for chills, diaphoresis, fatigue and fever. HENT: Negative for congestion, rhinorrhea, sinus pressure, sore throat, trouble swallowing and voice change. Eyes: Negative for photophobia and visual disturbance. Respiratory: Negative for cough, chest tightness and shortness of breath. Cardiovascular: Negative for chest pain, palpitations and leg swelling. Gastrointestinal: Positive for abdominal pain. Negative for blood in stool, constipation, diarrhea, nausea and vomiting. Genitourinary: Positive for hematuria. Musculoskeletal: Negative for arthralgias, myalgias and neck pain. Neurological: Negative for dizziness, weakness, light-headedness, numbness and headaches. All other systems reviewed and are negative. Vitals:    09/11/18 1809   BP: 142/89   Pulse: 82   Resp: 18   Temp: 98 °F (36.7 °C)   SpO2: 96%   Weight: 97.6 kg (215 lb 3.2 oz)   Height: 6' 2\" (1.88 m)            Physical Exam   Constitutional: He is oriented to person, place, and time. He appears well-developed and well-nourished. No distress. HENT:   Head: Normocephalic and atraumatic. Nose: Nose normal.   Mouth/Throat: Oropharynx is clear and moist. No oropharyngeal exudate. Eyes: Conjunctivae and EOM are normal. Right eye exhibits no discharge. Left eye exhibits no discharge. No scleral icterus. Neck: Normal range of motion. Neck supple. No JVD present. No tracheal deviation present. No thyromegaly present. Cardiovascular: Normal rate, regular rhythm, normal heart sounds and intact distal pulses. Exam reveals no gallop and no friction rub. No murmur heard. Pulmonary/Chest: Effort normal and breath sounds normal. No stridor. No respiratory distress. He has no wheezes. He has no rales. He exhibits no tenderness. Abdominal: Bowel sounds are normal. He exhibits no distension and no mass. There is tenderness in the left lower quadrant. There is no rebound. Mild LLQ tenderness. Musculoskeletal: Normal range of motion.  He exhibits no edema or tenderness. Lymphadenopathy:     He has no cervical adenopathy. Neurological: He is alert and oriented to person, place, and time. No cranial nerve deficit. Coordination normal.   Skin: Skin is warm and dry. No rash noted. He is not diaphoretic. No erythema. No pallor. Psychiatric: He has a normal mood and affect. His behavior is normal. Judgment and thought content normal.   Nursing note and vitals reviewed. Note written by Vincent Messina, as dictated by Kandi Ny MD 7:11 PM     MDM  Number of Diagnoses or Management Options  Renal calculi:   Diagnosis management comments: Renal calculi. Plan:  Cbc, cmp, lipase, ua, 1 L NS, toradol 15 mg IV, ct renal colic. Reassessment:  Pt with non obstructing 3 mm stone at UVJ. Will dc with NSAIDS. Amount and/or Complexity of Data Reviewed  Clinical lab tests: reviewed and ordered  Tests in the radiology section of CPT®: reviewed and ordered  Review and summarize past medical records: yes  Independent visualization of images, tracings, or specimens: yes    Risk of Complications, Morbidity, and/or Mortality  Presenting problems: moderate  Diagnostic procedures: moderate  Management options: moderate    Patient Progress  Patient progress: improved        ED Course       Procedures    11:10 AM  The patient has been reevaluated. The patient is ready for discharge. The patient's signs, symptoms, diagnosis, and discharge instructions have been discussed and the patient/ family has conveyed their understanding. The patient is to follow up as recommended or return to the ED should their symptoms worsen. Plan has been discussed and the patient is in agreement. LABORATORY TESTS:  No results found for this or any previous visit (from the past 12 hour(s)). IMAGING RESULTS:  CT ABD PELV WO CONT   Final Result        No results found.       MEDICATIONS GIVEN:  Medications   sodium chloride 0.9 % bolus infusion 1,000 mL (0 mL IntraVENous IV Completed 9/11/18 2003)   ketorolac (TORADOL) injection 15 mg (15 mg IntraVENous Given 9/11/18 1959)       IMPRESSION:  1. Renal calculi        PLAN:  1. Discharge Medication List as of 9/11/2018  7:46 PM      START taking these medications    Details   ketorolac (TORADOL) 10 mg tablet Take 1 Tab by mouth every six (6) hours as needed for Pain for up to 3 days. , Print, Disp-12 Tab, R-0         CONTINUE these medications which have NOT CHANGED    Details   predniSONE (DELTASONE) 20 mg tablet Take 80 mg by mouth daily (with breakfast). , Historical Med      omeprazole (PRILOSEC) 20 mg capsule Take 20 mg by mouth daily. , Historical Med      cholecalciferol (VITAMIN D3) 1,000 unit cap Take  by mouth daily. , Historical Med      calcium carbonate (CALTREX) 600 mg calcium (1,500 mg) tablet Take 600 mg by mouth two (2) times a day., Historical Med      aspirin 81 mg chewable tablet Take 1 Tab by mouth daily. , Print, Disp-30 Tab, R-0           2.    Follow-up Information     Follow up With Details Comments Contact Info    [facility] [place] [facility]  If symptoms worsen [address] [id number]  [address] [facility] Schedule an appointment as soon as possible for a visit  Angie   [phone]            Return to ED for new or worsening symptoms       Prachi Galeano MD

## 2018-09-11 NOTE — DISCHARGE INSTRUCTIONS
Cálculo renal: Instrucciones de cuidado - [ Kidney Stone: Care Instructions ]  Instrucciones de cuidado    Los cálculos renales se laura cuando se aglutinan sales, minerales y otras sustancias que normalmente se encuentran en la orina. Pueden ser laguna pequeños javed granos de arena o, raras veces, tan grandes javed pelotas de golf. Mientras el cálculo se desplaza por el uréter, que es el tubo que transporta la [place] del riñón a la vejiga, probablemente sienta dolor. El dolor puede ser leve o muy bandar. También puede compa algo de tyesha en la orina. En cuanto el cálculo llega a la vejiga, todo dolor intenso debería desaparecer. Si un cálculo es demasiado verna para ser expulsado por lino propia cuenta, quizás requiera un procedimiento médico para ayudarle a eliminar el cálculo. El médico lo castillo revisado detenidamente, chiquis pueden desarrollarse problemas más tarde. Si nota algún problema o nuevos síntomas, busque tratamiento médico de inmediato. La atención de seguimiento es adán parte clave de lino tratamiento y seguridad. Asegúrese de hacer y acudir a todas las citas, y llame a lino médico si está teniendo problemas. También es adán buena idea saber los resultados de sravani exámenes y mantener adán lista de los medicamentos que hugo. ¿Cómo puede cuidarse en el hogar? · Paola líquidos en abundancia, lo suficiente para que lino orina sea de color amarillo connie o transparente javed el agua. Si tiene enfermedad renal, cardíaca o hepática y tiene que restringir los líquidos, hable con lino médico antes de aumentar la cantidad de líquidos que estefany. · [employer] analgésicos (medicamentos para el dolor) exactamente según lo indicado. Llame a lino médico si cynthia que está teniendo un problema con lino medicamento. ¨ Si el médico le recetó un analgésico, tómelo según lo prescrito. ¨ Si no está tomando un analgésico recetado, pregúntele a lino médico si puede natalie chasidy de [employer].  Jessica y siga todas las instrucciones de la etiqueta. · Quizás lino médico le pida que cuele la orina para que pueda recoger el cálculo renal cuando lo elimine. Puede usar un colador de cocina o de té para atrapar el cálculo. Guárdelo en adán bolsa de plástico hasta que ronal a lino médico de nuevo. Cómo prevenir cálculos renales en el futuro  Algunos cambios en lino dieta pueden ayudar a prevenir los cálculos renales. Dependiendo de la causa de los cálculos, puede que lino médico le recomiende que:  · Paola líquidos en abundancia, lo suficiente para que lino orina sea de color amarillo connie o transparente javed el agua. Si tiene enfermedad renal, cardíaca o hepática y tiene que restringir los líquidos, hable con lino médico antes de aumentar la cantidad de líquidos que estefany. · Restrinja el café, el té y el alcohol. Además, evite el jugo de toronja. · No tome más de la dosis diaria recomendada de vitaminas C y D.  · Evite los antiácidos javed Gaviscon, Maalox, Mylanta o Tums. · Restrinja la cantidad de sal (sodio) en lino dieta. · Consuma adán dieta equilibrada que no sea demasiado petra en proteína. · Restrinja alimentos que spenser ricos en adán sustancia llamada oxalato, que puede causar cálculos renales. Estos alimentos [employer] verduras de color osvaldo oscuro, el ruibarbo, el chocolate, el salvado de maria e, las nueces, los arándanos y los frijoles. ¿Cuándo debe pedir ayuda? Llame a lino médico ahora mismo o busque atención médica inmediata si:    · No puede retener líquidos.     · Lino dolor empeora.     · Tiene fiebre o escalofríos.     · Tiene dolor de espalda nuevo o peor, lovely debajo de la caja torácica (el costado).     · Tiene nueva o más tyesha en la orina.    Vigile de cerca los cambios en lino lucy y asegúrese de comunicarse con lino médico si:    · No mejora javed se esperaba. ¿Dónde puede encontrar más información en inglés? Monica Gruber a [email or web address]   Dorota Jackson T315 en la búsqueda para aprender más acerca de \"Cálculo renal: Instrucciones de cuidado - [ Kidney Stone: Care Instructions ]. \"  Revisado: 12 haynes, 2017  Versión del contenido: 11.7  © 2337-5460 Healthwise, Inc[employer]. Las instrucciones de cuidado fueron adaptadas bajo licencia por [employer] (which disclaims liability or warranty for this information). Si usted tiene [place] afección médica o sobre estas instrucciones, siempre pregunte a lino profesional de lucy. [employer] niega toda garantía o responsabilidad por lino uso de esta información.

## 2018-09-11 NOTE — ED NOTES

## 2018-09-12 NOTE — ED NOTES
Patient has been medically cleared for discharge at this time. All discharge papers were given to patient, patient and family expressed understanding of all education at this time. Patient and family seen leaving the department with a steady gait and all belongings.

## 2018-09-21 ENCOUNTER — HOSPITAL ENCOUNTER (OUTPATIENT)
Dept: LAB | Age: 43
Discharge: HOME | End: 2018-09-21

## 2018-09-21 LAB
ALBUMIN SERPL-MCNC: 5.1 G/DL (ref 3.5–5)
ALBUMIN/GLOB SERPL: 1.6 {RATIO} (ref 1.1–2.2)
ALP SERPL-CCNC: 103 U/L (ref 45–117)
ALT SERPL-CCNC: 61 U/L (ref 12–78)
ANION GAP SERPL CALC-SCNC: 10 MMOL/L (ref 5–15)
AST SERPL-CCNC: 28 U/L (ref 15–37)
BASOPHILS # BLD: 0.1 K/UL (ref 0–0.1)
BASOPHILS NFR BLD: 1 % (ref 0–1)
BILIRUB SERPL-MCNC: 1 MG/DL (ref 0.2–1)
BUN SERPL-MCNC: 24 MG/DL (ref 6–20)
BUN/CREAT SERPL: 24 (ref 12–20)
CALCIUM SERPL-MCNC: 9.4 MG/DL (ref 8.5–10.1)
CHLORIDE SERPL-SCNC: 101 MMOL/L (ref 97–108)
CO2 SERPL-SCNC: 27 MMOL/L (ref 21–32)
CREAT SERPL-MCNC: 0.99 MG/DL (ref 0.7–1.3)
DIFFERENTIAL METHOD BLD: ABNORMAL
EOSINOPHIL # BLD: 0 K/UL (ref 0–0.4)
EOSINOPHIL NFR BLD: 0 % (ref 0–7)
ERYTHROCYTE [DISTWIDTH] IN BLOOD BY AUTOMATED COUNT: 14.6 % (ref 11.5–14.5)
GLOBULIN SER CALC-MCNC: 3.1 G/DL (ref 2–4)
GLUCOSE SERPL-MCNC: 99 MG/DL (ref 65–100)
HCT VFR BLD AUTO: 49.9 % (ref 36.6–50.3)
HGB BLD-MCNC: 16.3 G/DL (ref 12.1–17)
IMM GRANULOCYTES # BLD: 0.1 K/UL (ref 0–0.04)
IMM GRANULOCYTES NFR BLD AUTO: 1 % (ref 0–0.5)
LYMPHOCYTES # BLD: 1.1 K/UL (ref 0.8–3.5)
LYMPHOCYTES NFR BLD: 10 % (ref 12–49)
MCH RBC QN AUTO: 31.3 PG (ref 26–34)
MCHC RBC AUTO-ENTMCNC: 32.7 G/DL (ref 30–36.5)
MCV RBC AUTO: 95.8 FL (ref 80–99)
MONOCYTES # BLD: 0.7 K/UL (ref 0–1)
MONOCYTES NFR BLD: 6 % (ref 5–13)
NEUTS SEG # BLD: 9 K/UL (ref 1.8–8)
NEUTS SEG NFR BLD: 82 % (ref 32–75)
NRBC # BLD: 0 K/UL (ref 0–0.01)
NRBC BLD-RTO: 0 PER 100 WBC
PLATELET # BLD AUTO: 188 K/UL (ref 150–400)
PMV BLD AUTO: 12.7 FL (ref 8.9–12.9)
POTASSIUM SERPL-SCNC: 4.7 MMOL/L (ref 3.5–5.1)
PROT SERPL-MCNC: 8.2 G/DL (ref 6.4–8.2)
RBC # BLD AUTO: 5.21 M/UL (ref 4.1–5.7)
SODIUM SERPL-SCNC: 138 MMOL/L (ref 136–145)
T3FREE SERPL-MCNC: 4.2 PG/ML (ref 2.2–4)
T4 FREE SERPL-MCNC: 1 NG/DL (ref 0.8–1.5)
TSH SERPL DL<=0.05 MIU/L-ACNC: 0.36 UIU/ML (ref 0.36–3.74)
WBC # BLD AUTO: 10.9 K/UL (ref 4.1–11.1)

## 2018-09-21 PROCEDURE — 84443 ASSAY THYROID STIM HORMONE: CPT | Performed by: NURSE PRACTITIONER

## 2018-09-21 PROCEDURE — 85025 COMPLETE CBC W/AUTO DIFF WBC: CPT | Performed by: NURSE PRACTITIONER

## 2018-09-21 PROCEDURE — 84481 FREE ASSAY (FT-3): CPT | Performed by: NURSE PRACTITIONER

## 2018-09-21 PROCEDURE — 84439 ASSAY OF FREE THYROXINE: CPT | Performed by: NURSE PRACTITIONER

## 2018-09-21 PROCEDURE — 80053 COMPREHEN METABOLIC PANEL: CPT | Performed by: NURSE PRACTITIONER

## 2018-12-11 ENCOUNTER — HOSPITAL ENCOUNTER (OUTPATIENT)
Dept: LAB | Age: 43
Discharge: HOME | End: 2018-12-11

## 2018-12-11 LAB
ALBUMIN SERPL-MCNC: 4.3 G/DL (ref 3.5–5)
ALBUMIN/GLOB SERPL: 1.4 {RATIO} (ref 1.1–2.2)
ALP SERPL-CCNC: 102 U/L (ref 45–117)
ALT SERPL-CCNC: 46 U/L (ref 12–78)
ANION GAP SERPL CALC-SCNC: 7 MMOL/L (ref 5–15)
AST SERPL-CCNC: 23 U/L (ref 15–37)
BASOPHILS # BLD: 0 K/UL (ref 0–0.1)
BASOPHILS NFR BLD: 0 % (ref 0–1)
BILIRUB SERPL-MCNC: 0.4 MG/DL (ref 0.2–1)
BUN SERPL-MCNC: 14 MG/DL (ref 6–20)
BUN/CREAT SERPL: 19 (ref 12–20)
CALCIUM SERPL-MCNC: 9.2 MG/DL (ref 8.5–10.1)
CHLORIDE SERPL-SCNC: 104 MMOL/L (ref 97–108)
CO2 SERPL-SCNC: 28 MMOL/L (ref 21–32)
CREAT SERPL-MCNC: 0.74 MG/DL (ref 0.7–1.3)
DIFFERENTIAL METHOD BLD: ABNORMAL
EOSINOPHIL # BLD: 0 K/UL (ref 0–0.4)
EOSINOPHIL NFR BLD: 0 % (ref 0–7)
ERYTHROCYTE [DISTWIDTH] IN BLOOD BY AUTOMATED COUNT: 14 % (ref 11.5–14.5)
ERYTHROCYTE [SEDIMENTATION RATE] IN BLOOD: 2 MM/HR (ref 0–15)
GLOBULIN SER CALC-MCNC: 3.1 G/DL (ref 2–4)
GLUCOSE SERPL-MCNC: 91 MG/DL (ref 65–100)
HCT VFR BLD AUTO: 48.1 % (ref 36.6–50.3)
HGB BLD-MCNC: 15.2 G/DL (ref 12.1–17)
IMM GRANULOCYTES # BLD: 0 K/UL (ref 0–0.04)
IMM GRANULOCYTES NFR BLD AUTO: 0 % (ref 0–0.5)
LYMPHOCYTES # BLD: 1.1 K/UL (ref 0.8–3.5)
LYMPHOCYTES NFR BLD: 12 % (ref 12–49)
MCH RBC QN AUTO: 31.6 PG (ref 26–34)
MCHC RBC AUTO-ENTMCNC: 31.6 G/DL (ref 30–36.5)
MCV RBC AUTO: 100 FL (ref 80–99)
MONOCYTES # BLD: 0.4 K/UL (ref 0–1)
MONOCYTES NFR BLD: 5 % (ref 5–13)
NEUTS SEG # BLD: 7.7 K/UL (ref 1.8–8)
NEUTS SEG NFR BLD: 82 % (ref 32–75)
NRBC # BLD: 0 K/UL (ref 0–0.01)
NRBC BLD-RTO: 0 PER 100 WBC
PLATELET # BLD AUTO: 171 K/UL (ref 150–400)
PMV BLD AUTO: 12.9 FL (ref 8.9–12.9)
POTASSIUM SERPL-SCNC: 4.8 MMOL/L (ref 3.5–5.1)
PROT SERPL-MCNC: 7.4 G/DL (ref 6.4–8.2)
RBC # BLD AUTO: 4.81 M/UL (ref 4.1–5.7)
SODIUM SERPL-SCNC: 139 MMOL/L (ref 136–145)
T3FREE SERPL-MCNC: 3.1 PG/ML (ref 2.2–4)
T4 FREE SERPL-MCNC: 0.8 NG/DL (ref 0.8–1.5)
TSH SERPL DL<=0.05 MIU/L-ACNC: 0.41 UIU/ML (ref 0.36–3.74)
WBC # BLD AUTO: 9.3 K/UL (ref 4.1–11.1)

## 2018-12-11 PROCEDURE — 84481 FREE ASSAY (FT-3): CPT

## 2018-12-11 PROCEDURE — 84443 ASSAY THYROID STIM HORMONE: CPT

## 2018-12-11 PROCEDURE — 80053 COMPREHEN METABOLIC PANEL: CPT

## 2018-12-11 PROCEDURE — 84439 ASSAY OF FREE THYROXINE: CPT

## 2018-12-11 PROCEDURE — 85652 RBC SED RATE AUTOMATED: CPT

## 2018-12-11 PROCEDURE — 85025 COMPLETE CBC W/AUTO DIFF WBC: CPT

## 2018-12-21 ENCOUNTER — HOSPITAL ENCOUNTER (OUTPATIENT)
Dept: MRI IMAGING | Age: 43
Discharge: HOME | End: 2018-12-21
Attending: OPHTHALMOLOGY
Payer: SUBSIDIZED

## 2018-12-21 VITALS — BODY MASS INDEX: 27.77 KG/M2 | HEIGHT: 72 IN | WEIGHT: 205 LBS

## 2018-12-21 DIAGNOSIS — H05.122: ICD-10-CM

## 2018-12-21 DIAGNOSIS — H05.89 ORBITAL MASS: ICD-10-CM

## 2018-12-21 DIAGNOSIS — H05.822: ICD-10-CM

## 2018-12-21 PROCEDURE — 74011250636 HC RX REV CODE- 250/636: Performed by: OPHTHALMOLOGY

## 2018-12-21 PROCEDURE — A9575 INJ GADOTERATE MEGLUMI 0.1ML: HCPCS | Performed by: OPHTHALMOLOGY

## 2018-12-21 PROCEDURE — 70543 MRI ORBT/FAC/NCK W/O &W/DYE: CPT

## 2018-12-21 RX ORDER — GADOTERATE MEGLUMINE 376.9 MG/ML
20 INJECTION INTRAVENOUS
Status: COMPLETED | OUTPATIENT
Start: 2018-12-21 | End: 2018-12-21

## 2018-12-21 RX ADMIN — GADOTERATE MEGLUMINE 20 ML: 376.9 INJECTION INTRAVENOUS at 11:03

## 2019-05-14 ENCOUNTER — HOSPITAL ENCOUNTER (OUTPATIENT)
Dept: LAB | Age: 44
Discharge: HOME | End: 2019-05-14

## 2019-05-14 LAB
ALBUMIN SERPL-MCNC: 4.2 G/DL (ref 3.5–5)
ALBUMIN/GLOB SERPL: 1.6 {RATIO} (ref 1.1–2.2)
ALP SERPL-CCNC: 119 U/L (ref 45–117)
ALT SERPL-CCNC: 37 U/L (ref 12–78)
ANION GAP SERPL CALC-SCNC: 5 MMOL/L (ref 5–15)
AST SERPL-CCNC: 24 U/L (ref 15–37)
BASOPHILS # BLD: 0 K/UL (ref 0–0.1)
BASOPHILS NFR BLD: 1 % (ref 0–1)
BILIRUB SERPL-MCNC: 0.5 MG/DL (ref 0.2–1)
BUN SERPL-MCNC: 12 MG/DL (ref 6–20)
BUN/CREAT SERPL: 17 (ref 12–20)
CALCIUM SERPL-MCNC: 9.5 MG/DL (ref 8.5–10.1)
CHLORIDE SERPL-SCNC: 107 MMOL/L (ref 97–108)
CO2 SERPL-SCNC: 29 MMOL/L (ref 21–32)
CREAT SERPL-MCNC: 0.72 MG/DL (ref 0.7–1.3)
DIFFERENTIAL METHOD BLD: ABNORMAL
EOSINOPHIL # BLD: 0.1 K/UL (ref 0–0.4)
EOSINOPHIL NFR BLD: 2 % (ref 0–7)
ERYTHROCYTE [DISTWIDTH] IN BLOOD BY AUTOMATED COUNT: 14 % (ref 11.5–14.5)
ERYTHROCYTE [SEDIMENTATION RATE] IN BLOOD: 2 MM/HR (ref 0–15)
GLOBULIN SER CALC-MCNC: 2.7 G/DL (ref 2–4)
GLUCOSE SERPL-MCNC: 89 MG/DL (ref 65–100)
HCT VFR BLD AUTO: 46.2 % (ref 36.6–50.3)
HGB BLD-MCNC: 14.6 G/DL (ref 12.1–17)
IMM GRANULOCYTES # BLD AUTO: 0 K/UL (ref 0–0.04)
IMM GRANULOCYTES NFR BLD AUTO: 0 % (ref 0–0.5)
LYMPHOCYTES # BLD: 1.6 K/UL (ref 0.8–3.5)
LYMPHOCYTES NFR BLD: 23 % (ref 12–49)
MCH RBC QN AUTO: 31.4 PG (ref 26–34)
MCHC RBC AUTO-ENTMCNC: 31.6 G/DL (ref 30–36.5)
MCV RBC AUTO: 99.4 FL (ref 80–99)
MONOCYTES # BLD: 0.5 K/UL (ref 0–1)
MONOCYTES NFR BLD: 7 % (ref 5–13)
NEUTS SEG # BLD: 4.8 K/UL (ref 1.8–8)
NEUTS SEG NFR BLD: 67 % (ref 32–75)
NRBC # BLD: 0 K/UL (ref 0–0.01)
NRBC BLD-RTO: 0 PER 100 WBC
PLATELET # BLD AUTO: 149 K/UL (ref 150–400)
PMV BLD AUTO: 12.9 FL (ref 8.9–12.9)
POTASSIUM SERPL-SCNC: 5 MMOL/L (ref 3.5–5.1)
PROT SERPL-MCNC: 6.9 G/DL (ref 6.4–8.2)
RBC # BLD AUTO: 4.65 M/UL (ref 4.1–5.7)
SODIUM SERPL-SCNC: 141 MMOL/L (ref 136–145)
WBC # BLD AUTO: 7.1 K/UL (ref 4.1–11.1)

## 2019-05-14 PROCEDURE — 85652 RBC SED RATE AUTOMATED: CPT

## 2019-05-14 PROCEDURE — 85025 COMPLETE CBC W/AUTO DIFF WBC: CPT

## 2019-05-14 PROCEDURE — 80053 COMPREHEN METABOLIC PANEL: CPT

## 2019-07-03 ENCOUNTER — HOSPITAL ENCOUNTER (OUTPATIENT)
Dept: LAB | Age: 44
Discharge: HOME | End: 2019-07-03

## 2019-07-03 LAB
ALBUMIN SERPL-MCNC: 4.6 G/DL (ref 3.5–5)
ALBUMIN/GLOB SERPL: 1.6 {RATIO} (ref 1.1–2.2)
ALP SERPL-CCNC: 127 U/L (ref 45–117)
ALT SERPL-CCNC: 41 U/L (ref 12–78)
ANION GAP SERPL CALC-SCNC: 8 MMOL/L (ref 5–15)
AST SERPL-CCNC: 27 U/L (ref 15–37)
BASOPHILS # BLD: 0 K/UL (ref 0–0.1)
BASOPHILS NFR BLD: 1 % (ref 0–1)
BILIRUB SERPL-MCNC: 0.8 MG/DL (ref 0.2–1)
BUN SERPL-MCNC: 16 MG/DL (ref 6–20)
BUN/CREAT SERPL: 16 (ref 12–20)
CALCIUM SERPL-MCNC: 9.8 MG/DL (ref 8.5–10.1)
CHLORIDE SERPL-SCNC: 105 MMOL/L (ref 97–108)
CO2 SERPL-SCNC: 25 MMOL/L (ref 21–32)
COMMENT, HOLDF: NORMAL
CREAT SERPL-MCNC: 0.97 MG/DL (ref 0.7–1.3)
DIFFERENTIAL METHOD BLD: NORMAL
EOSINOPHIL # BLD: 0.1 K/UL (ref 0–0.4)
EOSINOPHIL NFR BLD: 1 % (ref 0–7)
ERYTHROCYTE [DISTWIDTH] IN BLOOD BY AUTOMATED COUNT: 14.1 % (ref 11.5–14.5)
GLOBULIN SER CALC-MCNC: 2.8 G/DL (ref 2–4)
GLUCOSE SERPL-MCNC: 138 MG/DL (ref 65–100)
HCT VFR BLD AUTO: 45.7 % (ref 36.6–50.3)
HGB BLD-MCNC: 15 G/DL (ref 12.1–17)
IMM GRANULOCYTES # BLD AUTO: 0 K/UL (ref 0–0.04)
IMM GRANULOCYTES NFR BLD AUTO: 0 % (ref 0–0.5)
LYMPHOCYTES # BLD: 1.5 K/UL (ref 0.8–3.5)
LYMPHOCYTES NFR BLD: 22 % (ref 12–49)
MCH RBC QN AUTO: 31.4 PG (ref 26–34)
MCHC RBC AUTO-ENTMCNC: 32.8 G/DL (ref 30–36.5)
MCV RBC AUTO: 95.8 FL (ref 80–99)
MONOCYTES # BLD: 0.5 K/UL (ref 0–1)
MONOCYTES NFR BLD: 7 % (ref 5–13)
NEUTS SEG # BLD: 4.7 K/UL (ref 1.8–8)
NEUTS SEG NFR BLD: 69 % (ref 32–75)
NRBC # BLD: 0 K/UL (ref 0–0.01)
NRBC BLD-RTO: 0 PER 100 WBC
PLATELET # BLD AUTO: 159 K/UL (ref 150–400)
POTASSIUM SERPL-SCNC: 3.9 MMOL/L (ref 3.5–5.1)
PROT SERPL-MCNC: 7.4 G/DL (ref 6.4–8.2)
RBC # BLD AUTO: 4.77 M/UL (ref 4.1–5.7)
SAMPLES BEING HELD,HOLD: NORMAL
SODIUM SERPL-SCNC: 138 MMOL/L (ref 136–145)
WBC # BLD AUTO: 6.8 K/UL (ref 4.1–11.1)

## 2019-07-03 PROCEDURE — 85025 COMPLETE CBC W/AUTO DIFF WBC: CPT

## 2019-07-03 PROCEDURE — 80053 COMPREHEN METABOLIC PANEL: CPT

## 2019-07-03 PROCEDURE — 85651 RBC SED RATE NONAUTOMATED: CPT

## 2019-11-08 ENCOUNTER — HOSPITAL ENCOUNTER (OUTPATIENT)
Dept: LAB | Age: 44
Discharge: HOME | End: 2019-11-08

## 2019-11-08 LAB — ERYTHROCYTE [SEDIMENTATION RATE] IN BLOOD: 8 MM/HR (ref 0–15)

## 2019-11-08 PROCEDURE — 85652 RBC SED RATE AUTOMATED: CPT

## 2022-08-25 ENCOUNTER — TRANSCRIBE ORDER (OUTPATIENT)
Dept: REGISTRATION | Age: 47
End: 2022-08-25

## 2022-08-25 ENCOUNTER — HOSPITAL ENCOUNTER (OUTPATIENT)
Dept: GENERAL RADIOLOGY | Age: 47
Discharge: HOME | End: 2022-08-25
Payer: SUBSIDIZED

## 2022-08-25 DIAGNOSIS — M54.50 LOW BACK PAIN: ICD-10-CM

## 2022-08-25 DIAGNOSIS — M25.562 LEFT KNEE PAIN: Primary | ICD-10-CM

## 2022-08-25 DIAGNOSIS — M25.562 LEFT KNEE PAIN: ICD-10-CM

## 2022-08-25 PROCEDURE — 72100 X-RAY EXAM L-S SPINE 2/3 VWS: CPT

## 2022-08-25 PROCEDURE — 73562 X-RAY EXAM OF KNEE 3: CPT

## 2023-05-23 NOTE — IP AVS SNAPSHOT
2700 [facility] [address] [place] [address] 
[phone] Patient: Jinny Lynne MRN: [id number] LHS:[id number] About your hospitalization You were admitted on:  November 5, 2017 You last received care in the:  Lyn Nicholson 07 1339 You were discharged on:  November 6, 2017 Why you were hospitalized Your primary diagnosis was:  Diplopia Things You Need To Do (next 8 weeks) Follow up with Thomas Riggs MD in 3 week(s) Call to get a neurology follow up appointment in 3-4 weeks following your appointment at the [address] [place] [address]. Phone:  [phone] Where:  15[facility], [address] [place] [address] Monday Nov 13, 2017 Follow up with [facility] Financial Screening Appointment: Monday, 11/13/17 at 9:00AM.  (Medical Appointment will be scheduled when you complete the financial screening). Please call the [facility] at [phone] if you need to change this appointment. Where:  820 [facility], [facility], [address] [place] [address] Discharge Orders None A check marisol indicates which time of day the medication should be taken. My Medications TAKE these medications as instructed Instructions Each Dose to Equal  
 Morning Noon Evening Bedtime  
 aspirin 81 mg chewable tablet Start taking on:  11/7/2017 Your last dose was: Your next dose is: Take 1 Tab by mouth daily. 81 mg Where to Get Your Medications Information on where to get these meds will be given to you by the nurse or doctor. ! Ask your nurse or doctor about these medications  
  aspirin 81 mg chewable tablet Discharge Instructions Discharge Instructions PATIENT ID: Jinny Lynne MRN: [id number] YOB: 1975 DATE OF ADMISSION: 11/5/2017 10:39 AM   
 DATE OF DISCHARGE: 11/6/2017 PRIMARY CARE PROVIDER: None ATTENDING PHYSICIAN: Chela Jack MD 
DISCHARGING PROVIDER: Chela Jack MD   
To contact this individual call [phone] and ask the  to page. If unavailable ask to be transferred the Adult Hospitalist Department. DISCHARGE DIAGNOSES 6 nerve palsy CONSULTATIONS: IP CONSULT TO NEUROLOGY 
IP CONSULT TO NEUROLOGY 
IP CONSULT TO HOSPITALIST 
 
PROCEDURES/SURGERIES: * No surgery found * PENDING TEST RESULTS:  
At the time of discharge the following test results are still pending: na 
 
FOLLOW UP APPOINTMENTS:  
Follow-up Information Follow up With Details Comments Contact Info [facility] On 11/13/2017 Financial Screening Appointment: Monday, 11/13/17 at 9:00AM.  (Medical Appointment will be scheduled when you complete the financial screening). Please call the [facility] at [phone] if you need to change this appointment. [address] [address] Cash Ayala MD In 3 weeks Call to get a neurology follow up appointment in 3-4 weeks following your appointment at the [place] [address] [address] [place] [address] Avenue 
[phone] ADDITIONAL CARE RECOMMENDATIONS: na 
 
DIET: Cardiac Diet ACTIVITY: Activity as tolerated WOUND CARE: na 
 
EQUIPMENT needed: na 
 
 
  
 SNF/Inpatient Rehab/LTAC  
x Independent/assisted living Hospice Other:  
 
  
 
Signed:  
Mk Plascencia MD 
11/6/2017 
12:29 PM 
 
  
  
  
Introducing [facility] & HE[facility] SERVICES! [facility] introduce portal paciente MyChart . Ahora se puede acceder a partes de lino expediente médico, enviar por correo electrónico la oficina de lino médico y solicitar renovaciones de medicamentos en línea. En lino navegador de Internet , Colletta Lipoma a [email or web address] Luisito clic en el usuario por [place]? Trude Patches clic aquí en la sesión Jordis [place]. Verá la página de registro [place]. Ingrese lino código de [employer] sarahy y javed aparece a continuación. Usted no tendrá que [employer] código después de compa completado el proceso de registro . Si usted no se inscribe antes de la fecha de caducidad , debe solicitar un nuevo código. · MyChart Código de acceso : [id number] Expires: 2/4/2018 12:30 PM 
 
Ingresa los últimos cuatro dígitos de lino Número de Seguro Social ( xxxx ) y fecha de nacimiento ( dd / mm / aaaa ) javed se indica y luisito clic en Enviar. Usted será llevado a la siguiente página de registro . Crear un ID MyChart . Esta será lino ID de inicio de sesión de MyChart y no puede ser [place] , por lo que pensar en adán que es Kip Nguyen y fácil de recordar . Crear adán contraseña MyChart . Usted puede cambiar lino contraseña en cualquier momento . Ingrese lino Password Reset de preguntas y [place] . [place] se puede utilizar en un momento posterior si usted olvida lino contraseña. Introduzca lino dirección de correo electrónico . Zac De La Cruz recibirá adán notificación por correo electrónico cuando la nueva información está disponible en MyChart . Ekaterina Pay clic en Registrarse. Shelda Kub lawrence y descargar porciones de lino expediente médico. 
Sivan clic en el enlace de descarga del menú Resumen para descargar adán copia portátil de lino información médica . Si tiene [employer] , por favor visite la sección de preguntas frecuentes del sitio web MyChart . Recuerde, MyChart NO es que se utilizará para las necesidades urgentes. Para emergencias médicas , llame al 911 . Ahora disponible en lino iPhone y Android ! Providers Seen During Your Hospitalization Provider Specialty Primary office phone Willard Burns MD Emergency Medicine [phone] Shahbaz Garland MD Internal Medicine [phone] Aziza Estrada MD Internal Medicine [phone] Your Primary Care Physician (PCP) Primary Care Physician Office Phone Office Fax NONE ** None ** ** None ** You are allergic to the following No active allergies Recent Documentation Weight 86.6 kg Emergency Contacts Name Discharge Info Relation Home Work Mobile Marco Kemp DISCHARGE CAREGIVER [3] Daughter [21] Patient Belongings The following personal items are in your possession at time of discharge: 
     Visual Aid: None Please provide this summary of care documentation to your next provider. Signatures-by signing, you are acknowledging that this After Visit Summary has been reviewed with you and you have received a copy. Patient Signature:  ____________________________________________________________ Date:  ____________________________________________________________  
  
López Del Castillo Provider Signature:  ____________________________________________________________ Date:  ____________________________________________________________  
  
  
   
  
[address] 
[phone] Patient: Gilma Gonzales MRN: [id number] TV Sobre mark hospitalización Le admitieron el:  2017 Mark tratamiento más reciente fue el:  [address] [place] [address] SURGICAL UNIT Le dieron de dilcia el:  2017 Por qué le ingresaron Mark diagnosis primaria es:  Diplopia Things You Need To Do (next 8 weeks) Follow up with Kathi Hemphill MD in 3 week(s) Call to get a neurology follow up appointment in 3-4 weeks following your appointment at the [address] [place] [address]. Phone:  [phone] Where:  15[facility], [address] [place] [address]  Follow up with [facility] Financial Screening Appointment: Monday, 17 at 9:00AM.  (Medical Appointment will be scheduled when you complete the financial screening). Please call the [facility] at L#[phone] if you need to change this appointment. Where:  820 [facility], [facility], [address] [place] [address] Discharge Orders [place] [employer] A check marisol indicates which time of day the medication should be taken. My Medications TAKE these medications as instructed Instructions Each Dose to Equal  
 Morning Noon Evening Bedtime  
 aspirin 81 mg chewable tablet Empiece a natalie el:  2017 Your last dose was: Your next dose is: Take 1 Tab by mouth daily. 81 mg  
    
   
   
   
  
  
  
Dónde puede recoger sravani medicamentos Información sobre dónde obtener estos medicamentos se le dará a usted por la enfermera o el médico.   
 ! Pregunte a mark médico o enfermero/a sobre estos medicamentos  
  aspirin 81 mg chewable tablet Instrucciones a chris de dilcia Discharge Instructions PATIENT ID: Gilma Gonzales MRN: [id number] YOB: 1975 DATE OF ADMISSION: 2017 10:39 AM   
DATE OF DISCHARGE: 2017 PRIMARY CARE PROVIDER: None ATTENDING PHYSICIAN: Aziza Estrada MD 
DISCHARGING PROVIDER: Aziza Estrada MD   
To contact this individual call [phone] and ask the  to page. If unavailable ask to be transferred the Adult Hospitalist Department. DISCHARGE DIAGNOSES 6 nerve palsy CONSULTATIONS: IP CONSULT TO NEUROLOGY 
IP CONSULT TO NEUROLOGY 
IP CONSULT TO HOSPITALIST 
 
PROCEDURES/SURGERIES: * No surgery found * PENDING TEST RESULTS:  
At the time of discharge the following test results are still pending: na 
 
FOLLOW UP APPOINTMENTS:  
Follow-up Information Follow up With Details Comments Contact Info [facility] On 11/13/2017 Financial Screening Appointment: Monday, 11/13/17 at 9:00AM.  (Medical Appointment will be scheduled when you complete the financial screening). Please call the [facility] at [phone] if you need to change this appointment. [address] [place] [address] Alessandra Ramirez MD In 3 weeks Call to get a neurology follow up appointment in 3-4 weeks following your appointment at the [place] [address] 
[phone] ADDITIONAL CARE RECOMMENDATIONS: na 
 
DIET: Cardiac Diet ACTIVITY: Activity as tolerated WOUND CARE: na 
 
EQUIPMENT needed: na 
 
 
  
 SNF/Inpatient Rehab/LTAC  
x Independent/assisted living Hospice Other:  
 
  
 
Signed:  
Mk Plascencia MD 
11/6/2017 
12:29 PM 
 
  
  
  
Introducing \[facility]\"" & HEALTH SERVICES! [facility] introduce portal paciente MyChart . Ahora se puede acceder a partes de lino expediente médico, enviar por correo electrónico la oficina de lino médico y solicitar renovaciones de medicamentos en línea. En lino navegador de Internet , Colletta Lipoma a [email or web address]. [email or web address]/mychart Luisito clic en el usuario por [place]? Trude Patches clic aquí en la sesión Jordis [place]. Verá la página de registro [place]. Ingrese lino código de [employer] sarahy y javed aparece a continuación. Usted no tendrá que [employer] código después de compa completado el proceso de registro . Si usted no se inscribe antes de la fecha de caducidad , debe solicitar un nuevo código. · MyChart Código de acceso : [id number] Expires: 2/4/2018 12:30 PM 
 
Ingresa los últimos cuatro dígitos de lino Número de Seguro Social ( xxxx ) y fecha de nacimiento ( dd / mm / aaaa ) javed se indica y luisito clic en Enviar. Usted será llevado a la siguiente página de registro . Crear un ID MyChart . Esta será lino ID de inicio de sesión de MyChart y no puede ser [place] , por lo que pensar en adán que es Kip Nguyen y fácil de recordar . Crear adán contraseña MyChart . Usted puede cambiar lino contraseña en cualquier momento . Ingrese lino Password Reset de preguntas y [place] . [place] se puede utilizar en un momento posterior si usted olvida lino contraseña. Introduzca lino dirección de correo electrónico . Zac De La Cruz recibirá adán notificación por correo electrónico cuando la nueva información está disponible en MyChart . Darrogerio Barbosa clic en Registrarse.  Tory Quinonez lawrence y descargar porciones de lino expediente médico. 
 Sivan bone en el enlace de descarga del menú Resumen para descargar adán copia portátil de mark información médica . Si tiene [employer] , por favor visite la sección de preguntas frecuentes del sitio web MyChart . Recuerde, MyChart NO es que se utilizará para las necesidades urgentes. Para emergencias médicas , llame al 911 . Ahora disponible en mark iPhone y Android ! Providers Seen During Your Hospitalization Personal [facility] Teléfono principal de la oficina Kimberly Szymanski MD Emergency Medicine [phone] Demario Mckenna MD Internal Medicine [phone] Ray Sampson MD Internal Medicine [phone] Mark médico de atención primaria (PCP ) Primary Care Physician Office Phone Office Fax NONE ** None ** ** None ** Tiene alergias a lo siguiente No tiene alergias Documentación recientes Weight 86.6 kg Emergency Contacts Name Discharge Info Relation Home Work Mobile Marco Kemp DISCHARGE CAREGIVER [3] Daughter [21] Patient Belongings The following personal items are in your possession at time of discharge: 
     Visual Aid: None Please provide this summary of care documentation to your next provider. Signatures-by signing, you are acknowledging that this After Visit Summary has been reviewed with you and you have received a copy. Patient Signature:  ____________________________________________________________ Date:  ____________________________________________________________  
  
Oliver Colindres Provider Signature:  ____________________________________________________________ Date:  ____________________________________________________________ show